# Patient Record
Sex: MALE | Race: WHITE | NOT HISPANIC OR LATINO | Employment: OTHER | URBAN - METROPOLITAN AREA
[De-identification: names, ages, dates, MRNs, and addresses within clinical notes are randomized per-mention and may not be internally consistent; named-entity substitution may affect disease eponyms.]

---

## 2017-04-18 ENCOUNTER — OFFICE VISIT (OUTPATIENT)
Dept: FAMILY MEDICINE | Facility: CLINIC | Age: 67
End: 2017-04-18
Payer: MEDICARE

## 2017-04-18 ENCOUNTER — DOCUMENTATION ONLY (OUTPATIENT)
Dept: FAMILY MEDICINE | Facility: CLINIC | Age: 67
End: 2017-04-18

## 2017-04-18 VITALS
TEMPERATURE: 98 F | WEIGHT: 203.94 LBS | HEART RATE: 66 BPM | SYSTOLIC BLOOD PRESSURE: 125 MMHG | HEIGHT: 70 IN | DIASTOLIC BLOOD PRESSURE: 84 MMHG | BODY MASS INDEX: 29.2 KG/M2

## 2017-04-18 DIAGNOSIS — E78.5 HYPERLIPIDEMIA, UNSPECIFIED HYPERLIPIDEMIA TYPE: ICD-10-CM

## 2017-04-18 DIAGNOSIS — T63.441A ALLERGIC REACTION TO BEE STING: ICD-10-CM

## 2017-04-18 DIAGNOSIS — J40 BRONCHITIS: ICD-10-CM

## 2017-04-18 DIAGNOSIS — J01.10 ACUTE NON-RECURRENT FRONTAL SINUSITIS: Primary | ICD-10-CM

## 2017-04-18 PROCEDURE — 99213 OFFICE O/P EST LOW 20 MIN: CPT | Mod: S$GLB,,, | Performed by: NURSE PRACTITIONER

## 2017-04-18 RX ORDER — FLUTICASONE PROPIONATE 50 MCG
1 SPRAY, SUSPENSION (ML) NASAL DAILY
COMMUNITY

## 2017-04-18 RX ORDER — AMOXICILLIN AND CLAVULANATE POTASSIUM 875; 125 MG/1; MG/1
1 TABLET, FILM COATED ORAL 2 TIMES DAILY
Qty: 10 TABLET | Refills: 0 | Status: SHIPPED | OUTPATIENT
Start: 2017-04-18 | End: 2017-04-24

## 2017-04-18 RX ORDER — BENZONATATE 200 MG/1
200 CAPSULE ORAL 3 TIMES DAILY PRN
Qty: 30 CAPSULE | Refills: 0 | Status: SHIPPED | OUTPATIENT
Start: 2017-04-18 | End: 2017-04-24

## 2017-04-18 RX ORDER — PREDNISONE 5 MG/1
TABLET ORAL
Qty: 21 TABLET | Refills: 0 | Status: SHIPPED | OUTPATIENT
Start: 2017-04-18 | End: 2017-09-01 | Stop reason: ALTCHOICE

## 2017-04-18 RX ORDER — ESOMEPRAZOLE MAGNESIUM 40 MG/1
40 CAPSULE, DELAYED RELEASE ORAL
COMMUNITY
End: 2018-10-09

## 2017-04-18 RX ORDER — ATENOLOL 25 MG/1
25 TABLET ORAL DAILY
COMMUNITY

## 2017-04-18 RX ORDER — LORATADINE 10 MG/1
10 TABLET ORAL DAILY PRN
COMMUNITY

## 2017-04-18 RX ORDER — ATORVASTATIN CALCIUM 40 MG/1
40 TABLET, FILM COATED ORAL DAILY
COMMUNITY
End: 2017-04-18 | Stop reason: SDUPTHER

## 2017-04-18 RX ORDER — ATORVASTATIN CALCIUM 40 MG/1
40 TABLET, FILM COATED ORAL NIGHTLY
Qty: 90 TABLET | Refills: 3 | Status: SHIPPED | OUTPATIENT
Start: 2017-04-18 | End: 2018-03-28 | Stop reason: SDUPTHER

## 2017-04-18 NOTE — PROGRESS NOTES
Health Maintenance Due   Topic Date Due    Hepatitis C Screening  1950    Lipid Panel  1950    TETANUS VACCINE  06/28/1970    Colonoscopy  06/28/2002    Zoster Vaccine  06/28/2012    Influenza Vaccine  08/01/2016

## 2017-04-18 NOTE — PROGRESS NOTES
Subjective:       Patient ID: Toby Baeza is a 66 y.o. male.    Chief Complaint: Cough    Cough   This is a new problem. The current episode started 1 to 4 weeks ago. The problem has been gradually improving. The cough is productive of sputum. Associated symptoms include wheezing. Pertinent negatives include no fever or shortness of breath. Nothing aggravates the symptoms. He has tried OTC cough suppressant (mucinex DM and loratidine) for the symptoms. The treatment provided moderate relief.     Review of Systems   Constitutional: Negative for fever.   Respiratory: Positive for cough and wheezing. Negative for shortness of breath.        Objective:      Physical Exam   Constitutional: He appears well-developed and well-nourished. No distress.   HENT:   Head: Normocephalic and atraumatic.   Right Ear: External ear normal.   Left Ear: External ear normal.   Mouth/Throat: Oropharynx is clear and moist. No oropharyngeal exudate.   Nares boggy and pale   Eyes: Conjunctivae are normal. Right eye exhibits no discharge. Left eye exhibits no discharge. No scleral icterus.   Neck: Normal range of motion. Neck supple.   Cardiovascular: Normal rate, regular rhythm and normal heart sounds.  Exam reveals no gallop and no friction rub.    No murmur heard.  Pulmonary/Chest: Effort normal and breath sounds normal. No respiratory distress. He has no wheezes. He has no rales.   Lymphadenopathy:     He has no cervical adenopathy.   Skin: Skin is warm and dry. He is not diaphoretic.   Psychiatric: He has a normal mood and affect. His behavior is normal.       Assessment:       1. Acute non-recurrent frontal sinusitis    2. Bronchitis    3. Allergic reaction to bee sting    4. Hyperlipidemia, unspecified hyperlipidemia type        Plan:       Acute non-recurrent frontal sinusitis  -     amoxicillin-clavulanate 875-125mg (AUGMENTIN) 875-125 mg per tablet; Take 1 tablet by mouth 2 (two) times daily. 1 tab po with food bid   Dispense: 10 tablet; Refill: 0    Bronchitis  -     benzonatate (TESSALON) 200 MG capsule; Take 1 capsule (200 mg total) by mouth 3 (three) times daily as needed for Cough.  Dispense: 30 capsule; Refill: 0  -     predniSONE (DELTASONE) 5 MG tablet; 6 tabs the first day, then 5 the next, then 4, 3, 2, 1  Dispense: 21 tablet; Refill: 0    Allergic reaction to bee sting  -     epinephrine 1 mg/mL Kit; Inject 1 each as directed daily as needed.  Dispense: 3 kit; Refill: 3    Hyperlipidemia, unspecified hyperlipidemia type  -     atorvastatin (LIPITOR) 40 MG tablet; Take 1 tablet (40 mg total) by mouth every evening.  Dispense: 90 tablet; Refill: 3         1 week if not better

## 2017-04-18 NOTE — MR AVS SNAPSHOT
[facility]  [address] [address] [address]  Phone: [phone]  Fax: [phone]                  Toby Baeza   2017 3:20 PM   Office Visit    Description:  Male : 1950   Provider:  JERARDO Quan   Department:  [facility]           Reason for Visit     Cough           Diagnoses this Visit        Comments    Acute non-recurrent frontal sinusitis    -  Primary     Bronchitis         Allergic reaction to bee sting         Hyperlipidemia, unspecified hyperlipidemia type                To Do List           Future Appointments        Provider Department Dept Phone    2017 11:00 AM Rafal Izaguirre MD [facility] [phone]      Goals (5 Years of Data)     None      Follow-Up and Disposition     Return if symptoms worsen or fail to improve in 1 week.    Follow-up and Disposition History       These Medications        Disp Refills Start End    amoxicillin-clavulanate 875-125mg (AUGMENTIN) 875-125 mg per tablet 10 tablet 0 2017    Take 1 tablet by mouth 2 (two) times daily. 1 tab po with food bid - Oral    Pharmacy: [facility] [address] [place] [address] [address] [facility] AT [facility] [address] Ph #: [phone]       benzonatate (TESSALON) 200 MG capsule 30 capsule 0 2017     Take 1 capsule (200 mg total) by mouth 3 (three) times daily as needed for Cough. - Oral    [facility] [id number]  [facility] [address] [facility] [address] Ph #: [phone]       predniSONE (DELTASONE) 5 MG tablet 21 tablet 0 2017     6 tabs the first day, then 5 the next, then 4, 3, 2, 1    Pharmacy: [facility] [id number]  [facility] [place] - [address] AT [facility] [address] Ph #: [phone]       epinephrine 1 mg/mL Kit 3 kit 3 2017     Inject 1 each as directed daily as needed. - Injection    [facility]: [facility] -   [address] Ph #: [phone]       atorvastatin (LIPITOR) 40 MG tablet 90 tablet 3 2017     Take 1 tablet (40 mg total) by mouth every evening. - Oral    [facility]: [facility] - [address] Ph #: [phone]         [facility]     [facility] On Call Nurse Care Line -  Assistance  Unless otherwise directed by your provider, please contact [facility], our nurse care line that is available for  assistance.     Registered nurses in the [facility] On Call Center provide: appointment scheduling, clinical advisement, health education, and other advisory services.  Call: [phone] (toll free)               Medications           Message regarding Medications     Verify the changes and/or additions to your medication regime listed below are the same as discussed with your clinician today.  If any of these changes or additions are incorrect, please notify your healthcare provider.        START taking these NEW medications        Refills    amoxicillin-clavulanate 875-125mg (AUGMENTIN) 875-125 mg per tablet 0    Sig: Take 1 tablet by mouth 2 (two) times daily. 1 tab po with food bid    Class: Normal    Route: Oral    benzonatate (TESSALON) 200 MG capsule 0    Sig: Take 1 capsule (200 mg total) by mouth 3 (three) times daily as needed for Cough.    Class: Normal    Route: Oral    predniSONE (DELTASONE) 5 MG tablet 0    Si tabs the first day, then 5 the next, then 4, 3, 2, 1    Class: Normal    epinephrine 1 mg/mL Kit 3    Sig: Inject 1 each as directed daily as needed.    Class: Normal    Route: Injection    atorvastatin (LIPITOR) 40 MG tablet 3    Sig: Take 1 tablet (40 mg total) by mouth every evening.    Class: Normal    Route: Oral           Verify that the below list of medications is an accurate representation of the medications you are currently taking.  If none reported, the list may be blank. If incorrect, please contact your  "healthcare provider. Carry this list with you in case of emergency.           Current Medications     ASPIRIN (ASPIR-81 ORAL) Take by mouth.    atenolol (TENORMIN) 25 MG tablet Take 25 mg by mouth once daily.    atorvastatin (LIPITOR) 40 MG tablet Take 1 tablet (40 mg total) by mouth every evening.    esomeprazole (NEXIUM) 40 MG capsule Take 40 mg by mouth before breakfast.    fluticasone (FLONASE) 50 mcg/actuation nasal spray 1 spray by Each Nare route once daily.    loratadine (CLARITIN) 10 mg tablet Take 10 mg by mouth daily as needed for Allergies.    SUMATRIPTAN SUCCINATE (IMITREX ORAL) Take 25 mg by mouth daily as needed.    amoxicillin-clavulanate 875-125mg (AUGMENTIN) 875-125 mg per tablet Take 1 tablet by mouth 2 (two) times daily. 1 tab po with food bid    benzonatate (TESSALON) 200 MG capsule Take 1 capsule (200 mg total) by mouth 3 (three) times daily as needed for Cough.    epinephrine 1 mg/mL Kit Inject 1 each as directed daily as needed.    predniSONE (DELTASONE) 5 MG tablet 6 tabs the first day, then 5 the next, then 4, 3, 2, 1           Clinical Reference Information           Your Vitals Were     BP Pulse Temp    125/84 (BP Location: Right arm, Patient Position: Sitting, BP Method: Automatic) 66 97.9 °F (36.6 °C) (Oral)    Height Weight BMI    5' 10" (1.778 m) 92.5 kg (203 lb 14.8 oz) 29.26 kg/m2      Blood Pressure          Most Recent Value    BP  125/84      Allergies as of 4/18/2017     Bee Venom Protein (Honey Bee)    Adhesive    Poison Deana Extract      Immunizations Administered on Date of Encounter - 4/18/2017     None      [facility] Sign-Up     Activating your [facility] account is as easy as 1-2-3!     1) Visit [email or web address], select Sign Up Now, enter this activation code and your date of birth, then select Next.  [email or web address]  Expires: 6/2/2017  2:59 PM      2) Create a username and password to use when you visit [facility] in the future and select a security question in case you lose " your password and select Next.    3) Enter your e-mail address and click Sign Up!    Additional Information  If you have questions, please e-mail [email or web address] or call [phone] to talk to our [facility] staff. Remember, [facility] is NOT to be used for urgent needs. For medical emergencies, dial 911.         Language Assistance Services     ATTENTION: Language assistance services are available, free of charge. Please call [phone].      ATENCIÓN: Si habla español, tiene a clemente disposición servicios gratuitos de asistencia lingüística. Llame al [phone].     CHÚ Ý: N?u b?n nói Ti?ng Vi?t, có các d?ch v? h? tr? ngôn ng? mi?n phí dành cho b?n. G?i s? [phone].         [facility] complies with applicable Federal civil rights laws and does not discriminate on the basis of race, color, national origin, age, disability, or sex.

## 2017-04-21 DIAGNOSIS — T63.441A ALLERGIC REACTION TO BEE STING: ICD-10-CM

## 2017-04-21 NOTE — TELEPHONE ENCOUNTER
rec'd fax from [employer] requesting clarification on rx epinephrine 1 mg/ml kit.    Fax states:  Please clarify the BRAND NAME DRUG NAME AND STRENGTH. Also include directions and quantity with your response. Resend.

## 2017-04-24 ENCOUNTER — DOCUMENTATION ONLY (OUTPATIENT)
Dept: FAMILY MEDICINE | Facility: CLINIC | Age: 67
End: 2017-04-24

## 2017-04-24 ENCOUNTER — OFFICE VISIT (OUTPATIENT)
Dept: FAMILY MEDICINE | Facility: CLINIC | Age: 67
End: 2017-04-24
Payer: MEDICARE

## 2017-04-24 VITALS
WEIGHT: 211.63 LBS | SYSTOLIC BLOOD PRESSURE: 148 MMHG | HEART RATE: 74 BPM | TEMPERATURE: 99 F | HEIGHT: 70 IN | DIASTOLIC BLOOD PRESSURE: 86 MMHG | OXYGEN SATURATION: 97 % | RESPIRATION RATE: 16 BRPM | BODY MASS INDEX: 30.3 KG/M2

## 2017-04-24 DIAGNOSIS — G43.909 MIGRAINE WITHOUT STATUS MIGRAINOSUS, NOT INTRACTABLE, UNSPECIFIED MIGRAINE TYPE: ICD-10-CM

## 2017-04-24 DIAGNOSIS — I10 ESSENTIAL HYPERTENSION: Primary | ICD-10-CM

## 2017-04-24 DIAGNOSIS — T78.40XA ALLERGIC REACTION, INITIAL ENCOUNTER: Primary | ICD-10-CM

## 2017-04-24 DIAGNOSIS — E78.5 HYPERLIPIDEMIA, UNSPECIFIED HYPERLIPIDEMIA TYPE: ICD-10-CM

## 2017-04-24 DIAGNOSIS — Z11.59 ENCOUNTER FOR HEPATITIS C SCREENING TEST FOR LOW RISK PATIENT: ICD-10-CM

## 2017-04-24 PROCEDURE — 99214 OFFICE O/P EST MOD 30 MIN: CPT | Mod: S$GLB,,, | Performed by: INTERNAL MEDICINE

## 2017-04-24 RX ORDER — SUMATRIPTAN SUCCINATE 100 MG/1
100 TABLET ORAL ONCE
Qty: 9 TABLET | Refills: 11 | Status: SHIPPED | OUTPATIENT
Start: 2017-04-24 | End: 2018-04-17 | Stop reason: SDUPTHER

## 2017-04-24 RX ORDER — EPINEPHRINE 0.3 MG/.3ML
1 INJECTION SUBCUTANEOUS ONCE
Qty: 4 EACH | Refills: 3 | Status: SHIPPED | OUTPATIENT
Start: 2017-04-24 | End: 2017-04-24

## 2017-04-24 NOTE — PATIENT INSTRUCTIONS
Lose 5 pounds.  Suggest [facility]    Low-Salt Diet  This diet removes foods that are high in salt. It also limits the amount of salt you use when cooking. It is most often used for people with high blood pressure, edema (fluid retention), and kidney, liver, or heart disease.  Table salt contains the mineral sodium. Your body needs sodium to work normally. But too much sodium can make your health problems worse. Your healthcare provider is recommending a low-salt (also called low-sodium) diet for you. Your total daily allowance of salt is 1,500 to 2,300 milligrams (mg). It is less than 1 teaspoon of table salt. This means you can have only about 500 to 700 mg of sodium at each meal. People with certain health problems should limit salt intake to the lower end of the recommended range.    When you cook, dont add much salt. If you can cook without using salt, even better. Dont add salt to your food at the table.  When shopping, read food labels. Salt is often called sodium on the label. Choose foods that are salt-free, low salt, or very low salt. Note that foods with reduced salt may not lower your salt intake enough.    Beans, potatoes, and pasta  Ok: Dry beans, split peas, lentils, potatoes, rice, macaroni, pasta, spaghetti without added salt  Avoid: Potato chips, tortilla chips, and similar products  Breads and cereals  Ok: Low-sodium breads, rolls, cereals, and cakes; low-salt crackers, matzo crackers  Avoid: Salted crackers, pretzels, popcorn, Burundian toast, pancakes, muffins  Dairy  Ok: Milk, chocolate milk, hot chocolate mix, low-salt cheeses, and yogurt  Avoid: Processed cheese and cheese spreads; Roquefort, Camembert, and cottage cheese; buttermilk, instant breakfast drink  Desserts  Ok: Ice cream, frozen yogurt, juice bars, gelatin, cookies and pies, sugar, honey, jelly, hard candy  Avoid: Most pies, cakes and cookies prepared or processed with salt; instant pudding  Drinks  Ok: Tea, coffee, fizzy  (carbonated) drinks, juices  Avoid: Flavored coffees, electrolyte replacement drinks, sports drinks  Meats  Ok: All fresh meat, fish, poultry, low-salt tuna, eggs, egg substitute  Avoid: Smoked, pickled, brine-cured, or salted meats and fish. This includes vazquez, chipped beef, corned beef, hot dogs, deli meats, ham, kosher meats, salt pork, sausage, canned tuna, salted codfish, smoked salmon, herring, sardines, or anchovies.  Seasonings and spices  Ok: Most seasonings are okay. Good substitutes for salt include: fresh herb blends, hot sauce, lemon, garlic, birch, vinegar, dry mustard, parsley, cilantro, horseradish, tomato paste, regular margarine, mayonnaise, unsalted butter, cream cheese, vegetable oil, cream, low-salt salad dressing and gravy.  Avoid: Regular ketchup, relishes, pickles, soy sauce, teriyaki sauce, Worcestershire sauce, BBQ sauce, tartar sauce, meat tenderizer, chili sauce, regular gravy, regular salad dressing, salted butter  Soups  Ok: Low-salt soups and broths made with allowed foods  Avoid: Bouillon cubes, soups with smoked or salted meats, regular soup and broth  Vegetables  Ok: Most vegetables are okay; also low-salt tomato and vegetable juices  Avoid: Sauerkraut and other brine-soaked vegetables; pickles and other pickled vegetables; tomato juice, olives  © 6951-6514 [employer]. [address] [place] [address]. All rights reserved. This information is not intended as a substitute for professional medical care. Always follow your healthcare professional's instructions.

## 2017-04-24 NOTE — MR AVS SNAPSHOT
[facility]  [address] [address] [address]  Phone: [phone]  Fax: [phone]                  Toby Baeza   2017 11:00 AM   Office Visit    Description:  Male : 1950   Provider:  Rafal Izaguirre MD   Department:  [facility]           Reason for Visit     Establish Care           Diagnoses this Visit        Comments    Essential hypertension    -  Primary     Hyperlipidemia, unspecified hyperlipidemia type         Migraine without status migrainosus, not intractable, unspecified migraine type         Encounter for hepatitis C screening test for low risk patient                To Do List           Future Appointments        Provider Department Dept Phone    2017 9:00 AM Rafal Izaguirre MD [facility] [phone]    10/23/2017 10:40 AM Rafal Izaguirre MD [facility] [phone]      Goals (5 Years of Data)     None      Follow-Up and Disposition     Return in about 6 months (around 10/24/2017).    Follow-up and Disposition History       These Medications        Disp Refills Start End    sumatriptan (IMITREX) 100 MG tablet 9 tablet 11 2017    Take 1 tablet (100 mg total) by mouth once. No more than twice in a 24 hour period - Oral    Ph[facility]acy: [facility] [address] #: [phone]         [facility]     [facility] On Call Nurse Care Line -  Assistance  Unless otherwise directed by your provider, please contact [facility], our nurse care line that is available for  assistance.     Registered nurses in the [facility] Center provide: appointment scheduling, clinical advisement, health education, and other advisory services.  Call: [phone] (toll free)               Medications           Message regarding Medications     Verify the changes and/or additions to your medication regime listed below are the  "same as discussed with your clinician today.  If any of these changes or additions are incorrect, please notify your healthcare provider.        START taking these NEW medications        Refills    sumatriptan (IMITREX) 100 MG tablet 11    Sig: Take 1 tablet (100 mg total) by mouth once. No more than twice in a 24 hour period    Class: Normal    Route: Oral      STOP taking these medications     amoxicillin-clavulanate 875-125mg (AUGMENTIN) 875-125 mg per tablet Take 1 tablet by mouth 2 (two) times daily. 1 tab po with food bid    benzonatate (TESSALON) 200 MG capsule Take 1 capsule (200 mg total) by mouth 3 (three) times daily as needed for Cough.           Verify that the below list of medications is an accurate representation of the medications you are currently taking.  If none reported, the list may be blank. If incorrect, please contact your healthcare provider. Carry this list with you in case of emergency.           Current Medications     ASPIRIN (ASPIR-81 ORAL) Take by mouth.    atenolol (TENORMIN) 25 MG tablet Take 25 mg by mouth once daily.    atorvastatin (LIPITOR) 40 MG tablet Take 1 tablet (40 mg total) by mouth every evening.    epinephrine 1 mg/mL Kit Inject 1 each as directed daily as needed.    esomeprazole (NEXIUM) 40 MG capsule Take 40 mg by mouth before breakfast.    fluticasone (FLONASE) 50 mcg/actuation nasal spray 1 spray by Each Nare route once daily.    loratadine (CLARITIN) 10 mg tablet Take 10 mg by mouth daily as needed for Allergies.    predniSONE (DELTASONE) 5 MG tablet 6 tabs the first day, then 5 the next, then 4, 3, 2, 1    sumatriptan (IMITREX) 100 MG tablet Take 1 tablet (100 mg total) by mouth once. No more than twice in a 24 hour period           Clinical Reference Information           Your Vitals Were     BP Pulse Temp Resp Height Weight    148/86 (BP Location: Right arm, Patient Position: Sitting, BP Method: Manual) 74 98.9 °F (37.2 °C) (Oral) 16 5' 10" (1.778 m) 96 kg (211 " lb 10.3 oz)    SpO2 BMI             97% 30.37 kg/m2         Blood Pressure          Most Recent Value    BP  (!)  148/86      Allergies as of 4/24/2017     Bee Venom Protein (Honey Bee)    Adhesive    Poison Deana Extract      Immunizations Administered on Date of Encounter - 4/24/2017     None      Orders Placed During Today's Visit     Future Labs/Procedures Expected by Expires    CBC auto differential  4/24/2017 4/25/2018    Comprehensive metabolic panel  4/24/2017 4/25/2018    Hepatitis C antibody  4/24/2017 6/23/2018    Lipid panel  4/24/2017 4/25/2018      [facility] Sign-Up     Activating your [facility] account is as easy as 1-2-3!     1) Visit [email or web address], select Sign Up Now, enter this activation code and your date of birth, then select Next.  [email or web address]  Expires: 6/2/2017  2:59 PM      2) Create a username and password to use when you visit [facility] in the future and select a security question in case you lose your password and select Next.    3) Enter your e-mail address and click Sign Up!    Additional Information  If you have questions, please e-mail [email or web address] or call [phone] to talk to our [facility] staff. Remember, [facility] is NOT to be used for urgent needs. For medical emergencies, dial 911.         Instructions    Lose 5 pounds.  Suggest [place] diet    Low-Salt Diet  This diet removes foods that are high in salt. It also limits the amount of salt you use when cooking. It is most often used for people with high blood pressure, edema (fluid retention), and kidney, liver, or heart disease.  Table salt contains the mineral sodium. Your body needs sodium to work normally. But too much sodium can make your health problems worse. Your healthcare provider is recommending a low-salt (also called low-sodium) diet for you. Your total daily allowance of salt is 1,500 to 2,300 milligrams (mg). It is less than 1 teaspoon of table salt. This means you can have only about 500 to 700  mg of sodium at each meal. People with certain health problems should limit salt intake to the lower end of the recommended range.    When you cook, dont add much salt. If you can cook without using salt, even better. Dont add salt to your food at the table.  When shopping, read food labels. Salt is often called sodium on the label. Choose foods that are salt-free, low salt, or very low salt. Note that foods with reduced salt may not lower your salt intake enough.    Beans, potatoes, and pasta  Ok: Dry beans, split peas, lentils, potatoes, rice, macaroni, pasta, spaghetti without added salt  Avoid: Potato chips, tortilla chips, and similar products  Breads and cereals  Ok: Low-sodium breads, rolls, cereals, and cakes; low-salt crackers, matzo crackers  Avoid: Salted crackers, pretzels, popcorn, Nepali toast, pancakes, muffins  Dairy  Ok: Milk, chocolate milk, hot chocolate mix, low-salt cheeses, and yogurt  Avoid: Processed cheese and cheese spreads; Roquefort, Camembert, and cottage cheese; buttermilk, instant breakfast drink  Desserts  Ok: Ice cream, frozen yogurt, juice bars, gelatin, cookies and pies, sugar, honey, jelly, hard candy  Avoid: Most pies, cakes and cookies prepared or processed with salt; instant pudding  Drinks  Ok: Tea, coffee, fizzy (carbonated) drinks, juices  Avoid: Flavored coffees, electrolyte replacement drinks, sports drinks  Meats  Ok: All fresh meat, fish, poultry, low-salt tuna, eggs, egg substitute  Avoid: Smoked, pickled, brine-cured, or salted meats and fish. This includes vazquez, chipped beef, corned beef, hot dogs, deli meats, ham, kosher meats, salt pork, sausage, canned tuna, salted codfish, smoked salmon, herring, sardines, or anchovies.  Seasonings and spices  Ok: Most seasonings are okay. Good substitutes for salt include: fresh herb blends, hot sauce, lemon, garlic, birch, vinegar, dry mustard, parsley, cilantro, horseradish, tomato paste, regular margarine, mayonnaise,  unsalted butter, cream cheese, vegetable oil, cream, low-salt salad dressing and gravy.  Avoid: Regular ketchup, relishes, pickles, soy sauce, teriyaki sauce, Worcestershire sauce, BBQ sauce, tartar sauce, meat tenderizer, chili sauce, regular gravy, regular salad dressing, salted butter  Soups  Ok: Low-salt soups and broths made with allowed foods  Avoid: Bouillon cubes, soups with smoked or salted meats, regular soup and broth  Vegetables  Ok: Most vegetables are okay; also low-salt tomato and vegetable juices  Avoid: Sauerkraut and other brine-soaked vegetables; pickles and other pickled vegetables; tomato juice, olives  © 2045-2637 [employer]. [address] [place] [address]. All rights reserved. This information is not intended as a substitute for professional medical care. Always follow your healthcare professional's instructions.       Language Assistance Services     ATTENTION: Language assistance services are available, free of charge. Please call [phone].      ATENCIÓN: Si habla michael, tiene a clemente disposición servicios gratuitos de asistencia lingüística. Llame al [phone].     CHÚ Ý: N?u b?n nói Ti?ng Vi?t, có các d?ch v? h? tr? ngôn ng? mi?n phí dành cho b?n. G?i s? [phone].         [facility] complies with applicable Federal civil rights laws and does not discriminate on the basis of race, color, national origin, age, disability, or sex.

## 2017-04-24 NOTE — PROGRESS NOTES
Subjective:       Patient ID: Toby Baeza is a 66 y.o. male.    Chief Complaint: Establish Care    HPI         CHIEF COMPLAINT: Hyperlipidemia. cholesterol screening: no.   HPI:     ONSET:    MODIFIERS/TREATMENTS: . Taking medications: yes. . Non-compliance with following diet: no. .     SYMPTOMS/RELATED:Possible medication side effects include:   Myalgia: no.  .     REVIEW OF SYMPTOMS: past weights:   Wt Readings from Last 1 Encounters:   04/24/17 1152 96 kg (211 lb 10.3 oz)                                                     Last lipids: total No results found for: CHOL                                                                  HDL No results found for: HDL                                                                  LDL No results found for: LDLCALC                                                                  TRIG No results found for: TRIG                                                                        CHIEF COMPLAINT: Hypertension  HPI:     ONSET:      QUALITY/COURSE:   Controlled:  yes     INTENSITY/SEVERITY:  Average blood pressure is  122/80.     MODIFIERS/TREATMENTS:  Taking medications: yes. .High sodium intake: no. alcohol: no      The following symptoms are positive only if BOLDED, otherwise are negative.      SYMPTOMS/RELATED: Possible medication side effects include:   Depression..  . Cough. . Constipation.    REVIEW OF SYMPTOMS: . Weight_loss . Weight_gain . Leg_cramps .Potency_problems .    TARGET ORGAN DAMAGE:: angina/ prior myocardial infarction, chronic kidney disease, heart failure, left ventricular hypertrophy, peripheral artery disease, prior coronary revascularization, retinopathy, stroke. transient ischemic attack.    Patient gets migraines with aura about twice a month.  Has photophobia and nausea and throbbing.  Usually bilateral.  Often wakes him up at night.  [facility] which doesn't seem to help much.  Review of Systems   Eyes: Negative  "for visual disturbance.   Respiratory: Negative for chest tightness and shortness of breath.    Cardiovascular: Negative for chest pain and leg swelling.   Neurological: Positive for headaches ( migraine). Negative for dizziness, syncope and weakness.   Psychiatric/Behavioral: Negative for dysphoric mood. The patient is not nervous/anxious.        Objective:      Vitals:    04/24/17 1152 04/24/17 1154   BP: (!) 151/93 (!) 148/86   Pulse: 74    Resp: 16    Temp: 98.9 °F (37.2 °C)    TempSrc: Oral    SpO2: 97%    Weight: 96 kg (211 lb 10.3 oz)    Height: 5' 10" (1.778 m)    PainSc: 0-No pain      Physical Exam   Constitutional: He appears well-developed and well-nourished.   Eyes: Pupils are equal, round, and reactive to light.   Cardiovascular: Normal rate, regular rhythm and normal heart sounds.    Pulmonary/Chest: Effort normal and breath sounds normal.   Abdominal: Soft. There is no tenderness.   Neurological: He is alert.   Psychiatric: He has a normal mood and affect. His behavior is normal. Thought content normal.   Nursing note and vitals reviewed.        Assessment:       1. Essential hypertension    2. Hyperlipidemia, unspecified hyperlipidemia type    3. Migraine without status migrainosus, not intractable, unspecified migraine type          Plan:     Essential hypertension    Hyperlipidemia, unspecified hyperlipidemia type    Migraine without status migrainosus, not intractable, unspecified migraine type    Other orders  -     sumatriptan (IMITREX) 100 MG tablet; Take 1 tablet (100 mg total) by mouth once. No more than twice in a 24 hour period  Dispense: 9 tablet; Refill: 11    No Follow-up on file.      "

## 2017-04-25 ENCOUNTER — TELEPHONE (OUTPATIENT)
Dept: FAMILY MEDICINE | Facility: CLINIC | Age: 67
End: 2017-04-25

## 2017-04-25 NOTE — TELEPHONE ENCOUNTER
----- Message from Mary Esparza sent at 4/24/2017  1:28 PM CDT -----  Contact: [facility],Kim Alvarez wants to speak with a nurse regarding varication on RX please call back at [phone] ref# [id number]

## 2017-08-02 ENCOUNTER — TELEPHONE (OUTPATIENT)
Dept: FAMILY MEDICINE | Facility: CLINIC | Age: 67
End: 2017-08-02

## 2017-08-02 NOTE — TELEPHONE ENCOUNTER
----- Message from Arlene Robbie sent at 8/2/2017 11:35 AM CDT -----  Contact: pt's wife walker/pt in the background [phone]  Pt's wife called just to touch base on the scan that they dropped off Monday? Pt stated they left scan with the man that is at the , and they just want Ms Sultana to look at it and provide advice.   They were told that Ms Sultana was out yesterday and today, but that she should be in clinic tomorrow. Pt was ok with that.    Please call [phone] when you get a moment  Thanks  rodney

## 2017-08-03 NOTE — TELEPHONE ENCOUNTER
Please notify patient that he has an abnormal MRI and he needs to follow up with the ordering physician to determine what is causing the abnormality.

## 2017-08-04 NOTE — TELEPHONE ENCOUNTER
Spoke with patient.  Instructed to keep appointments.  [facility].  Informed to call if he has questions or concerns.  Verbalized understanding.

## 2017-08-07 ENCOUNTER — PATIENT MESSAGE (OUTPATIENT)
Dept: FAMILY MEDICINE | Facility: CLINIC | Age: 67
End: 2017-08-07

## 2017-08-31 PROBLEM — R94.8 ABNORMAL BONE SCAN OF LUMBAR SPINE: Status: ACTIVE | Noted: 2017-08-31

## 2017-08-31 PROBLEM — R93.7 ABNORMAL MRI, LUMBAR SPINE: Status: ACTIVE | Noted: 2017-08-31

## 2017-08-31 PROBLEM — Z85.46 HISTORY OF PROSTATE CANCER: Status: ACTIVE | Noted: 2017-08-31

## 2017-09-01 ENCOUNTER — OFFICE VISIT (OUTPATIENT)
Dept: HEMATOLOGY/ONCOLOGY | Facility: CLINIC | Age: 67
End: 2017-09-01
Payer: MEDICARE

## 2017-09-01 VITALS
TEMPERATURE: 98 F | WEIGHT: 212.19 LBS | BODY MASS INDEX: 30.38 KG/M2 | HEIGHT: 70 IN | DIASTOLIC BLOOD PRESSURE: 98 MMHG | HEART RATE: 52 BPM | SYSTOLIC BLOOD PRESSURE: 156 MMHG

## 2017-09-01 DIAGNOSIS — R97.8 OTHER ABNORMAL TUMOR MARKERS: ICD-10-CM

## 2017-09-01 DIAGNOSIS — Z85.46 HISTORY OF PROSTATE CANCER: ICD-10-CM

## 2017-09-01 DIAGNOSIS — R94.8 ABNORMAL BONE SCAN OF LUMBAR SPINE: ICD-10-CM

## 2017-09-01 DIAGNOSIS — R93.7 ABNORMAL MRI, LUMBAR SPINE: ICD-10-CM

## 2017-09-01 PROCEDURE — 3080F DIAST BP >= 90 MM HG: CPT | Mod: ,,, | Performed by: INTERNAL MEDICINE

## 2017-09-01 PROCEDURE — 3077F SYST BP >= 140 MM HG: CPT | Mod: ,,, | Performed by: INTERNAL MEDICINE

## 2017-09-01 PROCEDURE — 99204 OFFICE O/P NEW MOD 45 MIN: CPT | Mod: ,,, | Performed by: INTERNAL MEDICINE

## 2017-09-01 NOTE — LETTER
September 1, 2017      Rafal Izaguirre MD  [address]  [place] [address]           [facility] Hematology Oncology  [address] [facility]  [address]  [place] [address]  Phone: [phone]  Fax: [phone]          Patient: Toby Baeza   MR Number: [id number]   YOB: 1950   Date of Visit: 9/1/2017       Dear Dr. Rafal Izaguirre:    Thank you for referring Toby Baeza to me for evaluation. Attached you will find relevant portions of my assessment and plan of care.    If you have questions, please do not hesitate to call me. I look forward to following Toby Baeza along with you.    Sincerely,    Salinas Cabrera MD    Enclosure  CC:  No Recipients    If you would like to receive this communication electronically, please contact [email or web address] or [phone] to request more information on [facility] access.    For providers and/or their staff who would like to refer a patient to [facility], please contact us through our one-stop-shop provider referral line, [facility], at [phone].    If you feel you have received this communication in error or would no longer like to receive these types of communications, please e-mail [email or web address]

## 2017-09-01 NOTE — PROGRESS NOTES
[facility] Hematolgy/Oncology  History & Physical    Subjective:      Patient ID:   NAME: Toby Baeza : 1950     67 y.o. male    Referring Doc: Dr Rafal Izaguirre  Other Physicians: Kawasaki, James Butler; [facility]; Mikki        Chief Complaint:   Abnormal bone scan    HPI:  67 y.o. male with diagnosis of abnormal bone scan and MRI of L-spine who has been referred by Dr Rafal Izaguirre for evaluation by medical hematology/oncology. Patient is here with his wife. He has been seen by Dr Sonu Salas with orthopedics. He is known to me previously several years ago because his first wife was a patient of mine. He has been having chronic back issues for over the past several years. He had injury while in [place] many years ago. [facility] and [facility] which showed lesion in L1 vertebrae.  He subsequently had a bone scan and was then referred to see Dr Salas. He is expected to see Dr Dung Young and/or Dr Fowler with neurosurgery in the near future.               ROS:   GEN: normal without any fever, night sweats or weight loss  HEENT: normal with no HA's, sore throat, stiff neck, changes in vision  CV: normal with no CP, SOB, PND, DOW or orthopnea  PULM: normal with no SOB, cough, hemoptysis, sputum or pleuritic pain  GI: normal with no abdominal pain, nausea, vomiting, constipation, diarrhea, melanotic stools, BRBPR, or hematemesis  : normal with no hematuria, dysuria  BREAST: normal with no mass, discharge, pain  SKIN: normal with no rash, erythema, bruising, or swelling       Past Medical/Surgical History:  Past Medical History:   Diagnosis Date    Abnormal bone scan of lumbar spine 2017    Abnormal MRI, lumbar spine 2017    Allergy     Back complaints     Cancer     prostate    Encounter for blood transfusion     History of prostate cancer 2017    Migraines     Prostate cancer      Past Surgical History:   Procedure Laterality Date    ANTERIOR  "TIBIAL TENDON TRANSFER  2003    PROCTECTOMY      PROSTATE SURGERY  1994    TONSILLECTOMY           Allergies:  Review of patient's allergies indicates:   Allergen Reactions    Bee venom protein (honey bee) Anaphylaxis    Adhesive     Poison ivy extract        Social/Family History:  Social History     Social History    Marital status:      Spouse name: N/A    Number of children: N/A    Years of education: N/A     Occupational History    Not on file.     Social History Main Topics    Smoking status: Former Smoker     Types: Cigarettes    Smokeless tobacco: Never Used    Alcohol use Yes      Comment: occasional    Drug use: No    Sexual activity: Not on file     Other Topics Concern    Not on file     Social History Narrative    No narrative on file     Family History   Problem Relation Age of Onset    Adopted: Yes         Medications:    Current Outpatient Prescriptions:     ASPIRIN (ASPIR-81 ORAL), Take by mouth., Disp: , Rfl:     atenolol (TENORMIN) 25 MG tablet, Take 25 mg by mouth once daily., Disp: , Rfl:     atorvastatin (LIPITOR) 40 MG tablet, Take 1 tablet (40 mg total) by mouth every evening., Disp: 90 tablet, Rfl: 3    epinephrine 1 mg/mL Kit, Inject 1 each as directed daily as needed., Disp: 3 kit, Rfl: 3    esomeprazole (NEXIUM) 40 MG capsule, Take 40 mg by mouth before breakfast., Disp: , Rfl:     fluticasone (FLONASE) 50 mcg/actuation nasal spray, 1 spray by Each Nare route once daily., Disp: , Rfl:     loratadine (CLARITIN) 10 mg tablet, Take 10 mg by mouth daily as needed for Allergies., Disp: , Rfl:     sumatriptan (IMITREX) 100 MG tablet, Take 1 tablet (100 mg total) by mouth once. No more than twice in a 24 hour period, Disp: 9 tablet, Rfl: 11      Pathology:    none    Objective:   Vitals:  Blood pressure (!) 156/98, pulse (!) 52, temperature 98.2 °F (36.8 °C), temperature source Oral, height 5' 10" (1.778 m), weight 96.3 kg (212 lb 3.2 oz).    Physical " Examination:   GEN: no apparent distress, comfortable; AAOx3  HEAD: atraumatic and normocephalic  EYES: no pallor, no icterus, PERRLA  ENT: OMM, no pharyngeal erythema, external ears WNL; no nasal discharge; no thrush  NECK: no masses, thyroid normal, trachea midline, no LAD/LN's, supple  CV: RRR with no murmur; normal pulse; normal S1 and S2; no pedal edema  CHEST: Normal respiratory effort; CTAB; normal breath sounds; no wheeze or crackles  ABDOM: nontender and nondistended; soft; normal bowel sounds; no rebound/guarding  MUSC/Skeletal: ROM normal; no crepitus; joints normal; no deformities or arthropathy  EXTREM: no clubbing, cyanosis, inflammation or swelling  SKIN: no rashes, lesions, ulcers, petechiae or subcutaneous nodules  : no kerr  NEURO: grossly intact; motor/sensory WNL; AAOx3; no tremors; uses cane  PSYCH: normal mood, affect and behavior  LYMPH: normal cervical, supraclavicular, axillary and groin LN's      Labs:     4/13/2017 in media section    Radiology/Diagnostic Studies:    Bone scan 8/22/17  MRI L-spine 7/27/17  L-spine Xrays 8/11/2017    All lab results and imaging results have been reviewed and discussed with the patient    Assessment:   (1) 67 y.o. male with diagnosis of abnormal MRI of L-spine from July 2017 and recent bone scan on 8/22 with abnormal areas of uptake in L1 vertebrae and sternum  - he has been having chronic back issues for over a year  - degenerative disc /arthritis issues  - prior lower back injury in 2001 while in [place] with the [employer]    (2) HTN and hypercholesterolemia    (3) Migraines    (4) Arthritis/OA    (5) Prostate Cancer in 1994 - [facility] and last PSA was done in 2016    (6) Colon polyps - followed by Dr Kaye with GI and had colonoscopy 2 years ago            Plan:       Abnormal bone scan of lumbar spine    Abnormal MRI, lumbar spine    History of prostate cancer            PLAN:  1. Schedule SPEP/24Hr UPEP, Ig panel and kappa/lambda  ratio  2. Check CEA, PSA  3. Schedule CT chest and abdom/pelvis  4. Patient to see Dr Young and Dr Fowler in near future  RTC in  2-3 weeks   Fax note to Rafal Izaguirre MD, Kawasaki, Butler, Dietz and Hector        I have explained and the patient understands all of  the current recommendation(s). I have answered all of their questions to the best of my ability and to their complete satisfaction.             Thank you for allowing me to participate in this patient's care. Please call with any questions or concerns.    Electronically signed Salinas Cabrera MD

## 2017-09-07 LAB — ISTAT CREATININE: 0.9 MG/DL (ref 0.6–1.4)

## 2017-09-08 LAB
CEA: 1.3 NG/ML
PSA, DIAGNOSTIC: <0 NG/ML (ref 0–3)

## 2017-09-09 LAB — CANCER AG19-9 SERPL-ACNC: 18 U/ML (ref 0–35)

## 2017-09-14 ENCOUNTER — OFFICE VISIT (OUTPATIENT)
Dept: HEMATOLOGY/ONCOLOGY | Facility: CLINIC | Age: 67
End: 2017-09-14
Payer: MEDICARE

## 2017-09-14 VITALS
BODY MASS INDEX: 29.66 KG/M2 | SYSTOLIC BLOOD PRESSURE: 144 MMHG | HEART RATE: 57 BPM | WEIGHT: 207.19 LBS | TEMPERATURE: 98 F | RESPIRATION RATE: 18 BRPM | DIASTOLIC BLOOD PRESSURE: 84 MMHG | HEIGHT: 70 IN

## 2017-09-14 DIAGNOSIS — Z85.46 HISTORY OF PROSTATE CANCER: ICD-10-CM

## 2017-09-14 DIAGNOSIS — R94.8 ABNORMAL BONE SCAN OF LUMBAR SPINE: ICD-10-CM

## 2017-09-14 DIAGNOSIS — R93.7 ABNORMAL MRI, LUMBAR SPINE: Primary | ICD-10-CM

## 2017-09-14 PROCEDURE — 3079F DIAST BP 80-89 MM HG: CPT | Mod: ,,, | Performed by: INTERNAL MEDICINE

## 2017-09-14 PROCEDURE — 99214 OFFICE O/P EST MOD 30 MIN: CPT | Mod: ,,, | Performed by: INTERNAL MEDICINE

## 2017-09-14 PROCEDURE — 3077F SYST BP >= 140 MM HG: CPT | Mod: ,,, | Performed by: INTERNAL MEDICINE

## 2017-09-14 NOTE — PROGRESS NOTES
"   [facility] Hematology/Oncology  PROGRESS NOTE      Subjective:       Patient ID:   NAME: Toby Baeza : 1950     67 y.o. male    Referring Doc: Rafal Izaguirre  Other Physicians: Kawasaki, James Butler; [facility]; Mikki    Chief Complaint:  abnorm MRI f/u    History of Present Illness:     Patient returns today for a 2nd regularly scheduled follow-up visit to go over the results of the recently ordered labs, tests and studies.  The patient met with Dr Dung Martinez with neurosurgery and he recommended biopsy. He had CT scans which is just showing the lesion on L1. He is here with his wife. He denies any CP, SOB, HA's or N/V.             ROS:   GEN: normal without any fever, night sweats or weight loss  HEENT: normal with no HA's, sore throat, stiff neck, changes in vision  CV: normal with no CP, SOB, PND, DOW or orthopnea  PULM: normal with no SOB, cough, hemoptysis, sputum or pleuritic pain  GI: normal with no abdominal pain, nausea, vomiting, constipation, diarrhea, melanotic stools, BRBPR, or hematemesis  : normal with no hematuria, dysuria  BREAST: normal with no mass, discharge, pain  SKIN: normal with no rash, erythema, bruising, or swelling    Allergies:  Review of patient's allergies indicates:   Allergen Reactions    Bee venom protein (honey bee) Anaphylaxis    Adhesive     Poison ivy extract        Medications:  See list      PMHx/PSHx Updates:  See patient's last visit with me on 2017.  See H&P on 2017        Pathology:  No matching staging information was found for the patient.          Objective:     Vitals:  Blood pressure (!) 144/84, pulse (!) 57, temperature 98 °F (36.7 °C), resp. rate 18, height 5' 10" (1.778 m), weight 94 kg (207 lb 3.2 oz).    Physical Examination:   GEN: no apparent distress, comfortable; AAOx3  HEAD: atraumatic and normocephalic  EYES: no pallor, no icterus, PERRLA  ENT: OMM, no pharyngeal erythema, external ears WNL; no nasal discharge; no thrush  NECK: " no masses, thyroid normal, trachea midline, no LAD/LN's, supple  CV: RRR with no murmur; normal pulse; normal S1 and S2; no pedal edema  CHEST: Normal respiratory effort; CTAB; normal breath sounds; no wheeze or crackles  ABDOM: nontender and nondistended; soft; normal bowel sounds; no rebound/guarding  MUSC/Skeletal: ROM normal; no crepitus; joints normal; no deformities or arthropathy  EXTREM: no clubbing, cyanosis, inflammation or swelling  SKIN: no rashes, lesions, ulcers, petechiae or subcutaneous nodules  : no kerr  NEURO: grossly intact; motor/sensory WNL; AAOx3; no tremors  PSYCH: normal mood, affect and behavior  LYMPH: normal cervical, supraclavicular, axillary and groin LN's            Labs:     9/8/2017      Radiology/Diagnostic Studies:    Ct Chest With Contrast    Result Date: 9/8/2017  CMS MANDATED QUALITY DATA - CT RADIATION ? 436 All CT scans at this facility utilize dose modulation, iterative reconstruction, and/or weight based dosing when appropriate to reduce radiation dose to as low as reasonably achievable. CT OF THE CHEST with  INTRAVENOUS CONTRAST CLINICAL INFORMATION: Restaging of prostate carcinoma. R 94.8 COMPARISON STUDIES: none FINDINGS : The exam was performed using 100 mL of intravenous Omnipaque 350. There are minimal arteriosclerotic changes in the aorta without aneurysm. No mediastinal or hilar lymphadenopathy or mass seen. Tracheobronchial tree, cardiac chambers and pericardium are normal. There are moderate calcifications and/or stents in the coronary arteries. There is a small sliding hiatal hernia. Lungs are clear. No pleural abnormality seen. There are no osteoblastic or osteoclastic skeletal changes in the bony thorax.     IMPRESSION: No active intrathoracic disease seen. No evidence of skeletal metastasis involving the bony thorax. Read and electronically signed by: Phi Wan MD on 9/8/2017 8:31 AM CDT PHI WAN MD    Ct Abdomen Pelvis With  Contrast    Result Date: 9/7/2017  CMS MANDATED QUALITY DATA - CT RADIATION ? 436 All CT scans at this facility utilize dose modulation, iterative reconstruction, and/or weight based dosing when appropriate to reduce radiation dose to as low as reasonably achievable. CT OF THE ABDOMEN AND PELVIS with  INTRAVENOUS CONTRAST CLINICAL INFORMATION: History of carcinoma of the prostate gland. Abnormal lesion in L1 vertebra seen on recent MRI of the lumbar spine dated 07/27/2017, suspicious for metastasis. R 94.8, R 93.7, Z 85.46, R 97.8 Comparison studies: MRI of the lumbar spine dated 08/22/2017, 20 2016 and 09/12/2012 FINDINGS : Exam was performed using 100 mL of intravenous Omnipaque 350. The gallbladder and the biliary tree, liver, portal vein, spleen, pancreas, adrenals, kidneys, ureters, urinary bladder and inferior vena cava are normal. There are mild diffuse arteriosclerotic changes in the aorta without aneurysm. Patient has had a radical prostatectomy. There are no enlarged pelvic, retroperitoneal or mesenteric lymph nodes. There is no ascites. There is no bowel thickening or dilatation. Appendix is normal. There is a small sliding hiatal hernia. No intra-abdominal masses, abnormal fluid collections, inflammatory changes or ventral hernia seen. There is a small subtle focal area of lucency in the left posterolateral aspect of the L1 vertebra probably extending to the left pedicle, corresponding to the abnormality seen and described on the MR scan. There is no disruption of the cortex or pathologic fracture and no associated mass extending into the epidural space or the paraspinal soft tissues. There are no osteoblastic or osteoclastic changes in the rest of the visualized bones.     IMPRESSION: 1. No intra-abdominal or pelvic abnormality seen. 2. Abnormal lesion in L1 vertebra corresponding to findings on last MRI of the lumbar spine. Since this lesion was not present on MR scans of the lumbar spine dated 912  20/12 and 20/1/2016 and in view of history of carcinoma of the prostate gland, findings are most likely due to solitary metastasis. This is amenable to fluoroscopic or CT guided percutaneous needle biopsy, if indicated. Read and electronically signed by: Phi Wan MD on 9/7/2017 12:12 PM CDT PHI WAN MD    [facility] Unknown Rad Eap    Result Date: 9/7/2017previously Reported As:CMS MANDATED QUALITY DATA - CT RADIATION ? 436All CT scans at this facility utilize dose modulation, iterative reconstruction, and/or weight based dosing when appropriate to reduce radiation dose to as low as reasonably achievable.CT OF THE ABDOMEN AND PELVIS with INTRAVENOUS CONTRASTCLINICAL INFORMATION: History of carcinoma of the prostate gland.Abnormal lesion in L1 vertebra seen on recent MRI of the lumbar spine dated 07/27/2017, suspicious for metastasis.R 94.8, R 93.7, Z 85.46, R 97.8Comparison studies: MRI of the lumbar spine dated 08/22/2017, 20 2016 and 09/12/2012FINDINGS : Exam was performed using 100 mL of intravenous Omnipaque 350.The gallbladder and the biliary tree, liver, portal vein, spleen, pancreas, adrenals, kidneys, ureters, urinary bladder and inferior vena cava are normal.There are mild diffuse arteriosclerotic changes in the aorta without aneurysm.Patient has had a radical prostatectomy.There are no enlarged pelvic, retroperitoneal or mesenteric lymph nodes.There is no ascites.There is no bowel thickening or dilatation.Appendix is normal.There is a small sliding hiatal hernia.No intra-abdominal masses, abnormal fluid collections, inflammatory changes or ventral hernia seen.There is a small subtle focal area of lucency in the left posterolateral aspect of the L1 vertebra probably extending to the left pedicle, corresponding to the abnormality seen and described on the MR scan.There is no disruption of the cortex or pathologic fracture and no associated mass extending into the epidural space or the paraspinal  soft tissues.There are no osteoblastic or osteoclastic changes in the rest of the visualized bones.    IMPRESSION:1. No intra-abdominal or pelvic abnormality seen.2. Abnormal lesion in L1 vertebra corresponding to findings on last MRI of the lumbar spine.Since this lesion was not present on MR scans of the lumbar spine dated 912 20/12 and 20/1/2016 and in view of history of carcinoma of the prostate gland, findings are most likely due to solitary metastasis.This is amenable to fluoroscopic or CT guided percutaneous needle biopsy, if indicated.Read and electronically signed by: Phi Jewell MD on 9/7/2017 12:12 PM CDT PHI JEWELL MD      I have reviewed all available lab results and radiology reports.    Assessment/Plan:     (1) 67 y.o. male with diagnosis of abnormal MRI of L-spine from July 2017 and recent bone scan on 8/22 with abnormal areas of uptake in L1 vertebrae and sternum  - he has been having chronic back issues for over a year  - degenerative disc /arthritis issues  - prior lower back injury in 2001 while in [place] with the [employer]  - CEA , AFPand  normal  - Ig panel normal  - beta 2 micro normal  - UPEP with IMF negative  - slight elevation in kappa/lambda ratio only at 24.33     (2) HTN and hypercholesterolemia     (3) Migraines     (4) Arthritis/OA     (5) Prostate Cancer in 1994 - [facility] and last PSA was done in 2016  - latest PSA was <0.0     (6) Colon polyps - followed by Dr Kaye with GI and had colonoscopy 2 years ago      PLAN:  1. Set Interventional radiology for biopsy of the L1 spot   2. Need latest notes from Dr Young  3. F/u with PCP, neurosurgery  4. RTC in 2-3 weeks  Fax note to Rafal Izaguirre MD, Kawasaki, Butler, Dietz and Hector    Discussion:     I have explained all of the above in detail and the patient understands all of the current recommendation(s). I have answered all of their questions to the best of my ability and to their complete satisfaction.    The patient is to continue with the current management plan.            Electronically signed by Salinas Cabrera MD

## 2017-09-14 NOTE — LETTER
September 14, 2017      Rafal Izaguirre MD  [address]  [facility] [address]           [facility] Hematology Oncology  [address] [facility]  [address]  [facility] [place] [address]  Phone: [phone]  Fax: [phone]          Patient: Toby Baeza   MR Number: [id number]   YOB: 1950   Date of Visit: 9/14/2017       Dear Dr. Rafal Izaguirre:    Thank you for referring Toby Baeza to me for evaluation. Attached you will find relevant portions of my assessment and plan of care.    If you have questions, please do not hesitate to call me. I look forward to following Toby Baeza along with you.    Sincerely,    Salinas Cabrera MD    Enclosure  CC:  No Recipients    If you would like to receive this communication electronically, please contact [email or web address] or [phone] to request more information on [employer] Link access.    For providers and/or their staff who would like to refer a patient to [facility], please contact us through our one-stop-shop provider referral line, [facility], at [phone].    If you feel you have received this communication in error or would no longer like to receive these types of communications, please e-mail [email or web address]

## 2017-09-18 NOTE — PROGRESS NOTES
I spoke to Dr Dustin Garza with Interventional Radiology and because of the anterior superior location of the abnormality in question on the lumbar vertebrae, it will be extremely difficult procedure and a negative result would be more likely nondiagnostic. Patient was seen by Dr Young with Neurosurgery and is to see Dr Fowler this week. We may opt to just repeat MRI in 3 months. Dr Garza said that the abnormality seen on MRI can not be discerned on the CT.

## 2017-10-09 ENCOUNTER — OFFICE VISIT (OUTPATIENT)
Dept: HEMATOLOGY/ONCOLOGY | Facility: CLINIC | Age: 67
End: 2017-10-09
Payer: MEDICARE

## 2017-10-09 VITALS
SYSTOLIC BLOOD PRESSURE: 130 MMHG | HEIGHT: 70 IN | HEART RATE: 71 BPM | BODY MASS INDEX: 26.04 KG/M2 | DIASTOLIC BLOOD PRESSURE: 69 MMHG | WEIGHT: 181.88 LBS | RESPIRATION RATE: 18 BRPM | TEMPERATURE: 98 F

## 2017-10-09 DIAGNOSIS — R94.8 ABNORMAL BONE SCAN OF LUMBAR SPINE: ICD-10-CM

## 2017-10-09 DIAGNOSIS — Z85.46 HISTORY OF PROSTATE CANCER: ICD-10-CM

## 2017-10-09 DIAGNOSIS — R93.7 ABNORMAL MRI, LUMBAR SPINE: Primary | ICD-10-CM

## 2017-10-09 PROCEDURE — 99214 OFFICE O/P EST MOD 30 MIN: CPT | Mod: ,,, | Performed by: INTERNAL MEDICINE

## 2017-10-09 RX ORDER — OMEPRAZOLE 40 MG/1
CAPSULE, DELAYED RELEASE ORAL
COMMUNITY
Start: 2017-08-05

## 2017-10-09 NOTE — LETTER
October 9, 2017      Rafal Izaguirre MD  [address]  [facility] [address]           [facility]  [address]  [facility] [address]  Phone: [phone]  Fax: [phone]          Patient: Toby Baeza   MR Number: [id number]   YOB: 1950   Date of Visit: 10/9/2017       Dear Dr. Rafal Izaguirre:    Thank you for referring Toby Baeza to me for evaluation. Attached you will find relevant portions of my assessment and plan of care.    If you have questions, please do not hesitate to call me. I look forward to following Toby Baeza along with you.    Sincerely,    Salinas Cabrera MD    Enclosure  CC:  No Recipients    If you would like to receive this communication electronically, please contact [email or web address] or [phone] to request more information on [facility] Link access.    For providers and/or their staff who would like to refer a patient to [facility], please contact us through our one-stop-shop provider referral line, [facility], at [phone].    If you feel you have received this communication in error or would no longer like to receive these types of communications, please e-mail [email or web address]

## 2017-10-09 NOTE — PROGRESS NOTES
[facility] Hematology/Oncology  PROGRESS NOTE      Subjective:       Patient ID:   NAME: Toby Baeza : 1950     67 y.o. male    Referring Doc: Rafal Izaguirre  Other Physicians: Kawasaki, James Butler, Albright, Tushar Osorio    Chief Complaint:  Abnormal vertebrae on scan    History of Present Illness:     Patient returns today for a regularly scheduled follow-up visit.  The patient has been seen by both Dr Martinez and Dr Finley. I discussed the case with Dr Dustin Garza with Interventional radiology previously and he was not confident that the lesion seen on MRI was able to be safely or easily biopsied. He has since seen Dr Finley with neurosurgery and he concurs that observation would be best option at this point. He had repeat MRI on 10/3 and the lesion in question has since resolved. He is here with his wife.             ROS:   GEN: normal without any fever, night sweats or weight loss  HEENT: normal with no HA's, sore throat, stiff neck, changes in vision  CV: normal with no CP, SOB, PND, DOW or orthopnea  PULM: normal with no SOB, cough, hemoptysis, sputum or pleuritic pain  GI: normal with no abdominal pain, nausea, vomiting, constipation, diarrhea, melanotic stools, BRBPR, or hematemesis  : normal with no hematuria, dysuria  BREAST: normal with no mass, discharge, pain  SKIN: normal with no rash, erythema, bruising, or swelling    Allergies:  Review of patient's allergies indicates:   Allergen Reactions    Bee venom protein (honey bee) Anaphylaxis    Adhesive     Poison ivy extract        Medications:    Current Outpatient Prescriptions:     ASPIRIN (ASPIR-81 ORAL), Take by mouth., Disp: , Rfl:     atenolol (TENORMIN) 25 MG tablet, Take 25 mg by mouth once daily., Disp: , Rfl:     atorvastatin (LIPITOR) 40 MG tablet, Take 1 tablet (40 mg total) by mouth every evening., Disp: 90 tablet, Rfl: 3    epinephrine 1 mg/mL Kit, Inject 1 each as directed daily as needed., Disp: 3 kit, Rfl:  "3    esomeprazole (NEXIUM) 40 MG capsule, Take 40 mg by mouth before breakfast., Disp: , Rfl:     fluticasone (FLONASE) 50 mcg/actuation nasal spray, 1 spray by Each Nare route once daily., Disp: , Rfl:     FLUZONE HIGH-DOSE 2017-18, PF, 180 mcg/0.5 mL vaccine, , Disp: , Rfl:     loratadine (CLARITIN) 10 mg tablet, Take 10 mg by mouth daily as needed for Allergies., Disp: , Rfl:     omeprazole (PRILOSEC) 40 MG capsule, , Disp: , Rfl:     sumatriptan (IMITREX) 100 MG tablet, Take 1 tablet (100 mg total) by mouth once. No more than twice in a 24 hour period, Disp: 9 tablet, Rfl: 11    PMHx/PSHx Updates:  See patient's last visit with me on 9/14/2017.  See H&P on 9/1/2017        Pathology:  No matching staging information was found for the patient.          Objective:     Vitals:  Blood pressure 130/69, pulse 71, temperature 98.2 °F (36.8 °C), resp. rate 18, height 5' 10" (1.778 m), weight 82.5 kg (181 lb 14.4 oz).    Physical Examination:   GEN: no apparent distress, comfortable; AAOx3  HEAD: atraumatic and normocephalic  EYES: no pallor, no icterus, PERRLA  ENT: OMM, no pharyngeal erythema, external ears WNL; no nasal discharge; no thrush  NECK: no masses, thyroid normal, trachea midline, no LAD/LN's, supple  CV: RRR with no murmur; normal pulse; normal S1 and S2; no pedal edema  CHEST: Normal respiratory effort; CTAB; normal breath sounds; no wheeze or crackles  ABDOM: nontender and nondistended; soft; normal bowel sounds; no rebound/guarding  MUSC/Skeletal: ROM normal; no crepitus; joints normal; no deformities or arthropathy  EXTREM: no clubbing, cyanosis, inflammation or swelling  SKIN: no rashes, lesions, ulcers, petechiae or subcutaneous nodules  : no kerr  NEURO: grossly intact; motor/sensory WNL; AAOx3; no tremors  PSYCH: normal mood, affect and behavior  LYMPH: normal cervical, supraclavicular, axillary and groin LN's            Labs:     9/25/2017      Radiology/Diagnostic Studies:    MRI 10/3 on " chart    I have reviewed all available lab results and radiology reports.    Assessment/Plan:   (1) 67 y.o. male with diagnosis of abnormal MRI of L-spine from July 2017 and recent bone scan on 8/22 with abnormal areas of uptake in L1 vertebrae and sternum  - he has been having chronic back issues for over a year  - degenerative disc /arthritis issues  - prior lower back injury in 2001 while in [place] with the [employer]  - CEA , AFPand  normal  - Ig panel normal  - beta 2 micro normal  - UPEP with IMF negative  - slight elevation in kappa/lambda ratio only at 24.33  - he has been seen by both Dr Martinez and Dr Finley. I discussed the case with Dr Dustin Garza with Interventional radiology previously and he was not confident that the lesion seen on MRI was able to be safely or easily biopsied. He has since seen Dr Finley with orthopedics and he concurs that observation would be best option at this point. He had repeat MRI on 10/3 and the lesion in question has since resolved.       (2) HTN and hypercholesterolemia     (3) Migraines     (4) Arthritis/OA     (5) Prostate Cancer in 1994 - followed by [facility] and last PSA was done in 2016  - latest PSA was <0.0     (6) Colon polyps - followed by Dr Kaye with GI and had colonoscopy 2 years ago           PLAN:  1. Observation only at this time  2. Repeat protein studies in 6 months as a precaution  3. F/U with PCP, ortho etc  4. RTC in 6 months  Fax note to Rafal Izaguirre MD, Kawasaki, Butler, Dietze, Maritza and Hector    Discussion:     I have explained all of the above in detail and the patient understands all of the current recommendation(s). I have answered all of their questions to the best of my ability and to their complete satisfaction.   The patient is to continue with the current management plan.            Electronically signed by Salinas Cabrera MD

## 2018-03-28 DIAGNOSIS — E78.5 HYPERLIPIDEMIA, UNSPECIFIED HYPERLIPIDEMIA TYPE: ICD-10-CM

## 2018-03-28 RX ORDER — ATORVASTATIN CALCIUM 40 MG/1
TABLET, FILM COATED ORAL
Qty: 90 TABLET | Refills: 0 | Status: SHIPPED | OUTPATIENT
Start: 2018-03-28

## 2018-04-17 RX ORDER — SUMATRIPTAN SUCCINATE 100 MG/1
100 TABLET ORAL ONCE
Qty: 9 TABLET | Refills: 0 | Status: SHIPPED | OUTPATIENT
Start: 2018-04-17 | End: 2021-07-16

## 2018-06-26 DIAGNOSIS — E78.5 HYPERLIPIDEMIA, UNSPECIFIED HYPERLIPIDEMIA TYPE: ICD-10-CM

## 2018-06-26 RX ORDER — ATORVASTATIN CALCIUM 40 MG/1
TABLET, FILM COATED ORAL
Qty: 90 TABLET | Refills: 0 | OUTPATIENT
Start: 2018-06-26

## 2018-06-26 NOTE — TELEPHONE ENCOUNTER
Patient has not been seen in over a year. He needs to be seen before we can refill her medications.

## 2018-09-30 ENCOUNTER — EXTERNAL CHRONIC CARE MANAGEMENT (OUTPATIENT)
Dept: PRIMARY CARE CLINIC | Facility: CLINIC | Age: 68
End: 2018-09-30

## 2018-10-09 ENCOUNTER — OFFICE VISIT (OUTPATIENT)
Dept: ORTHOPEDICS | Facility: CLINIC | Age: 68
End: 2018-10-09
Payer: MEDICARE

## 2018-10-09 VITALS
HEART RATE: 75 BPM | SYSTOLIC BLOOD PRESSURE: 146 MMHG | WEIGHT: 181 LBS | DIASTOLIC BLOOD PRESSURE: 93 MMHG | BODY MASS INDEX: 25.91 KG/M2 | HEIGHT: 70 IN

## 2018-10-09 DIAGNOSIS — S60.10XA CONTUSION OF NAIL BED OF FINGER, INITIAL ENCOUNTER: Primary | ICD-10-CM

## 2018-10-09 PROCEDURE — 73140 X-RAY EXAM OF FINGER(S): CPT | Mod: RT,,, | Performed by: ORTHOPAEDIC SURGERY

## 2018-10-09 PROCEDURE — 99213 OFFICE O/P EST LOW 20 MIN: CPT | Mod: 25,,, | Performed by: ORTHOPAEDIC SURGERY

## 2018-10-09 NOTE — PROGRESS NOTES
[facility] ORTHOPEDICS    Subjective:     Chief Complaint:   Chief Complaint   Patient presents with    Right Hand - Pain     Right hand ring finger pain x Friday night. States that he was taking out the garbage and feel. States that he hit his ring finger and it is now bruised and swollen at the tip.        Past Medical History:   Diagnosis Date    Abnormal bone scan of lumbar spine 8/31/2017    Abnormal MRI, lumbar spine 8/31/2017    Allergy     Back complaints     Cancer     prostate    Encounter for blood transfusion     History of prostate cancer 8/31/2017    Migraines     Prostate cancer        Past Surgical History:   Procedure Laterality Date    ANTERIOR TIBIAL TENDON TRANSFER  2003    PROCTECTOMY      PROSTATE SURGERY  1994    TONSILLECTOMY         Current Outpatient Medications   Medication Sig    atenolol (TENORMIN) 25 MG tablet Take 25 mg by mouth once daily.    atorvastatin (LIPITOR) 40 MG tablet TAKE 1 TABLET EVERY EVENING    omeprazole (PRILOSEC) 40 MG capsule     ASPIRIN (ASPIR-81 ORAL) Take by mouth.    epinephrine 1 mg/mL Kit Inject 1 each as directed daily as needed.    fluticasone (FLONASE) 50 mcg/actuation nasal spray 1 spray by Each Nare route once daily.    FLUZONE HIGH-DOSE 2017-18, PF, 180 mcg/0.5 mL vaccine     loratadine (CLARITIN) 10 mg tablet Take 10 mg by mouth daily as needed for Allergies.    sumatriptan (IMITREX) 100 MG tablet Take 1 tablet (100 mg total) by mouth once. No more than twice in a 24 hour period     No current facility-administered medications for this visit.        Review of patient's allergies indicates:   Allergen Reactions    Bee venom protein (honey bee) Anaphylaxis    Adhesive     Poison ivy extract        Family History   Adopted: Yes       Social History     Socioeconomic History    Marital status:      Spouse name: Not on file    Number of children: Not on file    Years of education: Not on file    Highest education level:  Not on file   Social Needs    Financial resource strain: Not on file    Food insecurity - worry: Not on file    Food insecurity - inability: Not on file    Transportation needs - medical: Not on file    Transportation needs - non-medical: Not on file   Occupational History    Not on file   Tobacco Use    Smoking status: Former Smoker     Types: Cigarettes    Smokeless tobacco: Never Used   Substance and Sexual Activity    Alcohol use: Yes     Comment: occasional    Drug use: No    Sexual activity: Not on file   Other Topics Concern    Not on file   Social History Narrative    Not on file       History of present illness: Patient comes in today for the right hand ring finger. He had a fall. He landed onto the nail. It became very painful. Now the pain is significantly improved.      Review of Systems:    Constitution: Negative for chills, fever, and sweats.  Negative for unexplained weight loss.    HENT:  Negative for headaches and blurry vision.    Cardiovascular:Negative for chest pain or irregular heart beat. Negative for hypertension.    Respiratory:  Negative for cough and shortness of breath.    Gastrointestinal: Negative for abdominal pain, heartburn, melena, nausea, and vomitting.    Genitourinary:  Negative bladder incontinence and dysuria.    Musculoskeletal:  See HPI for details.     Neurological: Negative for numbness.    Psychiatric/Behavioral: Negative for depression.  The patient is not nervous/anxious.      Endocrine: Negative for polyuria    Hematologic/Lymphatic: Negative for bleeding problem.  Does not bruise/bleed easily.    Skin: Negative for poor would healing and rash    Objective:      Physical Examination:    Vital Signs:    Vitals:    10/09/18 0956   BP: (!) 146/93   Pulse: 75       Body mass index is 25.97 kg/m².    This a well-developed, well nourished patient in no acute distress.  They are alert and oriented and cooperative to examination.        Patient has ecchymosis  around the right nail bed. He can fully extend and flex the DIP joint. He can close the fingers to the palm. Full range of motion of the contralateral hand.  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 There is in all fracture which is clearly chronic around the middle phalanx. No new fractures.    Assessment/Plan:      Nailbed hematoma. This will resolve on its own. Should the DIP joint remaining chronically painful he would need a DIP fusion.      This note was created using Dragon voice recognition software that occasionally misinterpreted phrases or words.

## 2018-10-15 ENCOUNTER — TELEPHONE (OUTPATIENT)
Dept: HEMATOLOGY/ONCOLOGY | Facility: CLINIC | Age: 68
End: 2018-10-15

## 2018-10-15 DIAGNOSIS — R94.8 ABNORMAL BONE SCAN OF LUMBAR SPINE: ICD-10-CM

## 2018-10-15 DIAGNOSIS — Z85.46 HISTORY OF PROSTATE CANCER: Primary | ICD-10-CM

## 2018-10-15 NOTE — TELEPHONE ENCOUNTER
Called patient told him put labs in he said he would get them done next week       ----- Message from Aisha La sent at 10/15/2018 11:33 AM CDT -----  Pt needs to reschedule appt but needs lab orders renewed. Goes to [facility] for labs.    CB# [phone]    THanks,    Aisha

## 2018-11-18 ENCOUNTER — PATIENT MESSAGE (OUTPATIENT)
Dept: HEMATOLOGY/ONCOLOGY | Facility: CLINIC | Age: 68
End: 2018-11-18

## 2018-11-19 ENCOUNTER — TELEPHONE (OUTPATIENT)
Dept: HEMATOLOGY/ONCOLOGY | Facility: CLINIC | Age: 68
End: 2018-11-19

## 2018-11-19 ENCOUNTER — PATIENT MESSAGE (OUTPATIENT)
Dept: HEMATOLOGY/ONCOLOGY | Facility: CLINIC | Age: 68
End: 2018-11-19

## 2018-11-19 DIAGNOSIS — Z85.46 HISTORY OF PROSTATE CANCER: Primary | ICD-10-CM

## 2018-11-20 LAB
ALBUMIN SERPL-MCNC: 4.3 G/DL (ref 3.1–4.7)
ALP SERPL-CCNC: 40 IU/L (ref 40–104)
ALT (SGPT): 131 IU/L (ref 3–33)
AST SERPL-CCNC: 81 IU/L (ref 10–40)
BASOPHILS NFR BLD: 0.1 K/UL (ref 0–0.2)
BASOPHILS NFR BLD: 1.8 %
BILIRUB SERPL-MCNC: 0.5 MG/DL (ref 0.3–1)
BUN SERPL-MCNC: 10 MG/DL (ref 8–20)
CALCIUM SERPL-MCNC: 9.9 MG/DL (ref 7.7–10.4)
CHLORIDE: 102 MMOL/L (ref 98–110)
CO2 SERPL-SCNC: 28.6 MMOL/L (ref 22.8–31.6)
CREATININE: 1.06 MG/DL (ref 0.6–1.4)
EOSINOPHIL NFR BLD: 0.2 K/UL (ref 0–0.7)
EOSINOPHIL NFR BLD: 3.2 %
ERYTHROCYTE [DISTWIDTH] IN BLOOD BY AUTOMATED COUNT: 13.7 % (ref 11.7–14.9)
GLUCOSE: 109 MG/DL (ref 70–99)
GRAN #: 3.1 K/UL (ref 1.4–6.5)
GRAN%: 55.6 %
HCT VFR BLD AUTO: 46.8 % (ref 39–55)
HGB BLD-MCNC: 15.4 G/DL (ref 14–16)
IMMATURE GRANS (ABS): 0 K/UL (ref 0–1)
IMMATURE GRANULOCYTES: 0.2 %
LYMPH #: 1.6 K/UL (ref 1.2–3.4)
LYMPH%: 28.2 %
MCH RBC QN AUTO: 34.1 PG (ref 25–35)
MCHC RBC AUTO-ENTMCNC: 32.9 G/DL (ref 31–36)
MCV RBC AUTO: 103.8 FL (ref 80–100)
MONO #: 0.6 K/UL (ref 0.1–0.6)
MONO%: 11 %
NUCLEATED RBCS: 0 %
PLATELET # BLD AUTO: 274 K/UL (ref 140–440)
PMV BLD AUTO: 11.3 FL (ref 8.8–12.7)
POTASSIUM SERPL-SCNC: 4.5 MMOL/L (ref 3.5–5)
PROT SERPL-MCNC: 7.3 G/DL (ref 6–8.2)
RBC # BLD AUTO: 4.51 M/UL (ref 4.3–5.9)
SODIUM: 139 MMOL/L (ref 134–144)
WBC # BLD AUTO: 5.6 K/UL (ref 5–10)

## 2018-11-21 LAB
KAPPA FREE LIGHT CHAINS: 17.2 MG/L (ref 3.3–19.4)
KAPPA/LAMBDA RATIO, S: 0.99 (ref 0.26–1.65)
LAMBDA FREE LIGHT CHAINS: 17.3 MG/L (ref 5.7–26.3)

## 2018-11-23 LAB
ALBUMIN SERPL-MCNC: 3.9 G/DL (ref 2.9–4.4)
ALBUMIN/GLOB SERPL ELPH: 1.2 {RATIO} (ref 0.7–1.7)
ALPHA 1 GLOBULIN/PROTEIN TOTAL: 0.2 G/DL (ref 0–0.4)
ALPHA 2 GLOBULIN/PROTEIN TOTAL: 0.8 G/DL (ref 0.4–1)
B-GLOBULIN FLD ELPH-MCNC: 1.1 G/DL (ref 0.7–1.3)
GAMMA GLOB FLD ELPH-MCNC: 1 G/DL (ref 0.4–1.8)
GLOBULIN SER CALC-MCNC: 3.2 G/DL (ref 2.2–3.9)
Lab: NORMAL
M PROTEIN MFR UR ELPH: NORMAL G/DL
PROT SERPL-MCNC: 7.1 G/DL (ref 6–8.5)

## 2018-11-26 ENCOUNTER — PATIENT MESSAGE (OUTPATIENT)
Dept: HEMATOLOGY/ONCOLOGY | Facility: CLINIC | Age: 68
End: 2018-11-26

## 2018-11-26 ENCOUNTER — TELEPHONE (OUTPATIENT)
Dept: HEMATOLOGY/ONCOLOGY | Facility: CLINIC | Age: 68
End: 2018-11-26

## 2018-12-27 ENCOUNTER — TELEPHONE (OUTPATIENT)
Dept: INTERNAL MEDICINE | Facility: CLINIC | Age: 68
End: 2018-12-27

## 2018-12-27 ENCOUNTER — TELEPHONE (OUTPATIENT)
Dept: ADMINISTRATIVE | Facility: HOSPITAL | Age: 68
End: 2018-12-27

## 2018-12-27 NOTE — TELEPHONE ENCOUNTER
----- Message from Rafal Izaguirre MD sent at 12/27/2018  1:00 PM CST -----  Patient needs to see his oncologist

## 2018-12-27 NOTE — TELEPHONE ENCOUNTER
Pt wanted Dr. Izaguirre to know he saw Dr. Villagran and asked that we have his most recent visit record sent to us for his chart. I will take care of that.

## 2019-11-15 ENCOUNTER — PES CALL (OUTPATIENT)
Dept: ADMINISTRATIVE | Facility: CLINIC | Age: 69
End: 2019-11-15

## 2020-06-23 ENCOUNTER — LAB VISIT (OUTPATIENT)
Dept: PRIMARY CARE CLINIC | Facility: OTHER | Age: 70
End: 2020-06-23
Payer: MEDICARE

## 2020-06-23 DIAGNOSIS — Z11.59 SPECIAL SCREENING EXAMINATION FOR UNSPECIFIED VIRAL DISEASE: ICD-10-CM

## 2020-06-23 PROCEDURE — U0003 INFECTIOUS AGENT DETECTION BY NUCLEIC ACID (DNA OR RNA); SEVERE ACUTE RESPIRATORY SYNDROME CORONAVIRUS 2 (SARS-COV-2) (CORONAVIRUS DISEASE [COVID-19]), AMPLIFIED PROBE TECHNIQUE, MAKING USE OF HIGH THROUGHPUT TECHNOLOGIES AS DESCRIBED BY CMS-2020-01-R: HCPCS

## 2020-06-26 ENCOUNTER — TELEPHONE (OUTPATIENT)
Dept: FAMILY MEDICINE | Facility: CLINIC | Age: 70
End: 2020-06-26

## 2020-06-26 DIAGNOSIS — Z86.19 HISTORY OF VIRAL ILLNESS: Primary | ICD-10-CM

## 2020-06-26 LAB — SARS-COV-2 RNA RESP QL NAA+PROBE: NOT DETECTED

## 2021-01-19 ENCOUNTER — OFFICE VISIT (OUTPATIENT)
Dept: ORTHOPEDICS | Facility: CLINIC | Age: 71
End: 2021-01-19
Payer: MEDICARE

## 2021-01-19 VITALS
HEIGHT: 70 IN | BODY MASS INDEX: 26.48 KG/M2 | DIASTOLIC BLOOD PRESSURE: 78 MMHG | HEART RATE: 64 BPM | SYSTOLIC BLOOD PRESSURE: 130 MMHG | WEIGHT: 185 LBS

## 2021-01-19 DIAGNOSIS — M12.811 ROTATOR CUFF ARTHROPATHY OF RIGHT SHOULDER: ICD-10-CM

## 2021-01-19 DIAGNOSIS — M75.41 IMPINGEMENT SYNDROME OF RIGHT SHOULDER: Primary | ICD-10-CM

## 2021-01-19 PROCEDURE — 99213 PR OFFICE/OUTPT VISIT, EST, LEVL III, 20-29 MIN: ICD-10-PCS | Mod: 25,S$GLB,, | Performed by: ORTHOPAEDIC SURGERY

## 2021-01-19 PROCEDURE — 99213 OFFICE O/P EST LOW 20 MIN: CPT | Mod: 25,S$GLB,, | Performed by: ORTHOPAEDIC SURGERY

## 2021-01-19 PROCEDURE — 20610 LARGE JOINT ASPIRATION/INJECTION: R SUBACROMIAL BURSA: ICD-10-PCS | Mod: RT,S$GLB,, | Performed by: ORTHOPAEDIC SURGERY

## 2021-01-19 PROCEDURE — 20610 DRAIN/INJ JOINT/BURSA W/O US: CPT | Mod: RT,S$GLB,, | Performed by: ORTHOPAEDIC SURGERY

## 2021-01-19 RX ORDER — METHYLPREDNISOLONE ACETATE 40 MG/ML
40 INJECTION, SUSPENSION INTRA-ARTICULAR; INTRALESIONAL; INTRAMUSCULAR; SOFT TISSUE
Status: DISCONTINUED | OUTPATIENT
Start: 2021-01-19 | End: 2021-01-19 | Stop reason: HOSPADM

## 2021-01-19 RX ORDER — DICLOFENAC SODIUM 10 MG/G
GEL TOPICAL
COMMUNITY
Start: 2021-01-06

## 2021-01-19 RX ORDER — SILDENAFIL 100 MG/1
TABLET, FILM COATED ORAL
COMMUNITY
Start: 2021-01-06

## 2021-01-19 RX ADMIN — METHYLPREDNISOLONE ACETATE 40 MG: 40 INJECTION, SUSPENSION INTRA-ARTICULAR; INTRALESIONAL; INTRAMUSCULAR; SOFT TISSUE at 12:01

## 2021-03-02 ENCOUNTER — OFFICE VISIT (OUTPATIENT)
Dept: ORTHOPEDICS | Facility: CLINIC | Age: 71
End: 2021-03-02
Payer: MEDICARE

## 2021-03-02 VITALS
HEART RATE: 62 BPM | HEIGHT: 70 IN | SYSTOLIC BLOOD PRESSURE: 129 MMHG | BODY MASS INDEX: 27.77 KG/M2 | WEIGHT: 194 LBS | DIASTOLIC BLOOD PRESSURE: 80 MMHG

## 2021-03-02 DIAGNOSIS — M75.41 IMPINGEMENT SYNDROME OF RIGHT SHOULDER: Primary | ICD-10-CM

## 2021-03-02 DIAGNOSIS — M75.42 IMPINGEMENT SYNDROME OF LEFT SHOULDER: ICD-10-CM

## 2021-03-02 PROCEDURE — 99213 OFFICE O/P EST LOW 20 MIN: CPT | Mod: S$GLB,,, | Performed by: ORTHOPAEDIC SURGERY

## 2021-03-02 PROCEDURE — 99213 PR OFFICE/OUTPT VISIT, EST, LEVL III, 20-29 MIN: ICD-10-PCS | Mod: S$GLB,,, | Performed by: ORTHOPAEDIC SURGERY

## 2021-04-29 ENCOUNTER — PATIENT MESSAGE (OUTPATIENT)
Dept: RESEARCH | Facility: HOSPITAL | Age: 71
End: 2021-04-29

## 2021-06-21 ENCOUNTER — OFFICE VISIT (OUTPATIENT)
Dept: ORTHOPEDICS | Facility: CLINIC | Age: 71
End: 2021-06-21
Payer: MEDICARE

## 2021-06-21 VITALS — WEIGHT: 194 LBS | BODY MASS INDEX: 27.77 KG/M2 | HEIGHT: 70 IN

## 2021-06-21 DIAGNOSIS — S20.212A CONTUSION OF RIB ON LEFT SIDE, INITIAL ENCOUNTER: ICD-10-CM

## 2021-06-21 DIAGNOSIS — S60.212A CONTUSION OF LEFT WRIST, INITIAL ENCOUNTER: Primary | ICD-10-CM

## 2021-06-21 DIAGNOSIS — S63.502A SPRAIN OF LEFT WRIST, INITIAL ENCOUNTER: ICD-10-CM

## 2021-06-21 PROCEDURE — 99213 OFFICE O/P EST LOW 20 MIN: CPT | Mod: S$GLB,,, | Performed by: PHYSICIAN ASSISTANT

## 2021-06-21 PROCEDURE — 99213 PR OFFICE/OUTPT VISIT, EST, LEVL III, 20-29 MIN: ICD-10-PCS | Mod: S$GLB,,, | Performed by: PHYSICIAN ASSISTANT

## 2021-06-21 RX ORDER — ATORVASTATIN CALCIUM 80 MG/1
TABLET, FILM COATED ORAL
COMMUNITY
Start: 2021-04-06 | End: 2022-01-13

## 2021-07-16 ENCOUNTER — OFFICE VISIT (OUTPATIENT)
Dept: ORTHOPEDICS | Facility: CLINIC | Age: 71
End: 2021-07-16
Payer: MEDICARE

## 2021-07-16 VITALS — BODY MASS INDEX: 27.77 KG/M2 | WEIGHT: 194 LBS | HEIGHT: 70 IN

## 2021-07-16 DIAGNOSIS — S76.311A STRAIN OF RIGHT HAMSTRING MUSCLE, INITIAL ENCOUNTER: ICD-10-CM

## 2021-07-16 DIAGNOSIS — M16.11 ARTHRITIS OF RIGHT HIP: Primary | ICD-10-CM

## 2021-07-16 DIAGNOSIS — S69.82XA TFCC (TRIANGULAR FIBROCARTILAGE COMPLEX) INJURY, LEFT, INITIAL ENCOUNTER: ICD-10-CM

## 2021-07-16 DIAGNOSIS — M17.11 PRIMARY OSTEOARTHRITIS OF RIGHT KNEE: ICD-10-CM

## 2021-07-16 PROCEDURE — 99213 PR OFFICE/OUTPT VISIT, EST, LEVL III, 20-29 MIN: ICD-10-PCS | Mod: S$GLB,,, | Performed by: PHYSICIAN ASSISTANT

## 2021-07-16 PROCEDURE — 99213 OFFICE O/P EST LOW 20 MIN: CPT | Mod: S$GLB,,, | Performed by: PHYSICIAN ASSISTANT

## 2021-07-19 ENCOUNTER — HOSPITAL ENCOUNTER (OUTPATIENT)
Dept: RADIOLOGY | Facility: HOSPITAL | Age: 71
Discharge: HOME | End: 2021-07-19
Attending: PHYSICIAN ASSISTANT
Payer: MEDICARE

## 2021-07-19 DIAGNOSIS — S69.82XA TFCC (TRIANGULAR FIBROCARTILAGE COMPLEX) INJURY, LEFT, INITIAL ENCOUNTER: ICD-10-CM

## 2021-07-19 PROCEDURE — 73221 MRI JOINT UPR EXTREM W/O DYE: CPT | Mod: TC,PO,LT

## 2021-07-20 ENCOUNTER — OFFICE VISIT (OUTPATIENT)
Dept: ORTHOPEDICS | Facility: CLINIC | Age: 71
End: 2021-07-20
Payer: MEDICARE

## 2021-07-20 VITALS — WEIGHT: 197 LBS | BODY MASS INDEX: 28.2 KG/M2 | HEIGHT: 70 IN

## 2021-07-20 DIAGNOSIS — M24.132 DEGENERATIVE TEAR OF TRIANGULAR FIBROCARTILAGE COMPLEX (TFCC) OF LEFT WRIST: Primary | ICD-10-CM

## 2021-07-20 PROCEDURE — 99213 PR OFFICE/OUTPT VISIT, EST, LEVL III, 20-29 MIN: ICD-10-PCS | Mod: 25,S$GLB,, | Performed by: ORTHOPAEDIC SURGERY

## 2021-07-20 PROCEDURE — 20550 TENDON SHEATH: ICD-10-PCS | Mod: LT,S$GLB,, | Performed by: ORTHOPAEDIC SURGERY

## 2021-07-20 PROCEDURE — 20550 NJX 1 TENDON SHEATH/LIGAMENT: CPT | Mod: LT,S$GLB,, | Performed by: ORTHOPAEDIC SURGERY

## 2021-07-20 PROCEDURE — 99213 OFFICE O/P EST LOW 20 MIN: CPT | Mod: 25,S$GLB,, | Performed by: ORTHOPAEDIC SURGERY

## 2021-07-20 RX ORDER — METHYLPREDNISOLONE ACETATE 40 MG/ML
40 INJECTION, SUSPENSION INTRA-ARTICULAR; INTRALESIONAL; INTRAMUSCULAR; SOFT TISSUE
Status: DISCONTINUED | OUTPATIENT
Start: 2021-07-20 | End: 2021-07-20 | Stop reason: HOSPADM

## 2021-07-20 RX ADMIN — METHYLPREDNISOLONE ACETATE 40 MG: 40 INJECTION, SUSPENSION INTRA-ARTICULAR; INTRALESIONAL; INTRAMUSCULAR; SOFT TISSUE at 12:07

## 2022-01-13 ENCOUNTER — OFFICE VISIT (OUTPATIENT)
Dept: PODIATRY | Facility: CLINIC | Age: 72
End: 2022-01-13
Payer: MEDICARE

## 2022-01-13 ENCOUNTER — HOSPITAL ENCOUNTER (OUTPATIENT)
Dept: RADIOLOGY | Facility: CLINIC | Age: 72
Discharge: HOME | End: 2022-01-13
Attending: PODIATRIST
Payer: MEDICARE

## 2022-01-13 VITALS — WEIGHT: 210.5 LBS | BODY MASS INDEX: 30.14 KG/M2 | HEIGHT: 70 IN

## 2022-01-13 DIAGNOSIS — M20.42 HAMMER TOES OF BOTH FEET: ICD-10-CM

## 2022-01-13 DIAGNOSIS — S96.911A TENDON TEAR, FOOT, RIGHT, INITIAL ENCOUNTER: Primary | ICD-10-CM

## 2022-01-13 DIAGNOSIS — M79.672 PAIN IN BOTH FEET: ICD-10-CM

## 2022-01-13 DIAGNOSIS — M25.571 ACUTE BILATERAL ANKLE PAIN: ICD-10-CM

## 2022-01-13 DIAGNOSIS — S96.911A TENDON TEAR, ANKLE, RIGHT, INITIAL ENCOUNTER: ICD-10-CM

## 2022-01-13 DIAGNOSIS — M79.671 PAIN IN BOTH FEET: ICD-10-CM

## 2022-01-13 DIAGNOSIS — M25.371 INSTABILITY OF ANKLE, RIGHT: ICD-10-CM

## 2022-01-13 DIAGNOSIS — M20.41 HAMMER TOES OF BOTH FEET: ICD-10-CM

## 2022-01-13 DIAGNOSIS — M25.572 ACUTE BILATERAL ANKLE PAIN: ICD-10-CM

## 2022-01-13 PROCEDURE — 73630 X-RAY EXAM OF FOOT: CPT | Mod: 50,S$GLB,, | Performed by: RADIOLOGY

## 2022-01-13 PROCEDURE — 99204 PR OFFICE/OUTPT VISIT, NEW, LEVL IV, 45-59 MIN: ICD-10-PCS | Mod: S$GLB,,, | Performed by: PODIATRIST

## 2022-01-13 PROCEDURE — 73630 XR FOOT COMPLETE 3 VIEW BILATERAL: ICD-10-PCS | Mod: 50,S$GLB,, | Performed by: RADIOLOGY

## 2022-01-13 PROCEDURE — 99204 OFFICE O/P NEW MOD 45 MIN: CPT | Mod: S$GLB,,, | Performed by: PODIATRIST

## 2022-01-13 RX ORDER — ZINC GLUCONATE 50 MG
TABLET ORAL
COMMUNITY
Start: 2021-11-09

## 2022-01-13 RX ORDER — CARBOXYMETHYLCELLULOSE SODIUM 10 MG/ML
GEL OPHTHALMIC
COMMUNITY
Start: 2021-12-16

## 2022-01-13 RX ORDER — BLACK COHOSH ROOT 200 MG
CAPSULE ORAL
COMMUNITY
Start: 2021-11-09

## 2022-01-13 NOTE — PROGRESS NOTES
"  [address] [facility] [address]  [place] [address]  Phone: [phone]   [phone]    Patient's PCP:Rafal Izaguirre MD  Referring Provider: Aaareferral Self    Subjective:      Chief Complaint:: Foot Problem (bilateral)    LOLI Baeza is a 71 y.o. male who presents with a complaint of problems with both feet/ankles. States the pain varies where it is in the foot, depending on the day. 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  States he has balance issues and has had several falls in June/July 2021 and has never really recovered from balance.  States he hyperextended his right leg with one fall, pulling his hamstring 3x in 4 weeks in July 2021.  Any uneven pavement causes him to stumble and fall. The last fall he had, he rolled his right foot all the way in and felt like he was walking on balloons.  Due to this, his right foot is weaker.   Also has bilateral foot drop - left foot foot drop is more pronounced.  Bilateral feet feel like pins and needles off and on.    He went to Dr. Vaca on Monday, who checked his crystals and confirmed aligned and nothing was off/wrong with them.  Dr. Kim ordered PT which patient states did help.      Vitals:    01/13/22 0845   Weight: 95.5 kg (210 lb 8 oz)   Height: 5' 10" (1.778 m)   PainSc:   8      Shoe Size: 11.5    Past Surgical History:   Procedure Laterality Date    ANTERIOR TIBIAL TENDON TRANSFER  2003    COLONOSCOPY      PROCTECTOMY      PROSTATE SURGERY  1994    TONSILLECTOMY       Past Medical History:   Diagnosis Date    Abnormal bone scan of lumbar spine 8/31/2017    Abnormal MRI, lumbar spine 8/31/2017    Allergy     Back complaints     Cancer     prostate    Encounter for blood transfusion     History of prostate cancer 8/31/2017    Migraines     Prostate cancer      Family History   Adopted: Yes        Social History:   Marital Status:   Alcohol History:  reports " current alcohol use.  Tobacco History:  reports that he has quit smoking. His smoking use included cigarettes. He has never used smokeless tobacco.  Drug History:  reports no history of drug use.    Review of patient's allergies indicates:   Allergen Reactions    Bee venom protein (honey bee) Anaphylaxis    Adhesive     Latex     Poison ivy extract        Current Outpatient Medications   Medication Sig Dispense Refill    atenolol (TENORMIN) 25 MG tablet Take 25 mg by mouth once daily.      atorvastatin (LIPITOR) 40 MG tablet TAKE 1 TABLET EVERY EVENING 90 tablet 0    diclofenac sodium (VOLTAREN) 1 % Gel       epinephrine 1 mg/mL Kit Inject 1 each as directed daily as needed. 3 kit 3    fluticasone (FLONASE) 50 mcg/actuation nasal spray 1 spray by Each Nare route once daily.      loratadine (CLARITIN) 10 mg tablet Take 10 mg by mouth daily as needed for Allergies.      multivit-min/FA/lycopen/lutein (CENTRUM SILVER MEN ORAL) Take by mouth.      mv-mn/iron/folic acid/herb 190 (VITAMIN D3 COMPLETE ORAL) Take by mouth.      omeprazole (PRILOSEC) 40 MG capsule       REFRESH LIQUIGEL 1 % ophthalmic solution Place into both eyes.      sildenafiL (VIAGRA) 100 MG tablet       sumatriptan (IMITREX) 100 MG tablet Take 1 tablet (100 mg total) by mouth once. No more than twice in a 24 hour period 9 tablet 0    VITAMIN B-12 1000 MCG tablet Take by mouth.      VITAMIN C 500 MG tablet Take by mouth.      ZINC ORAL Take by mouth.      ASPIRIN (ASPIR-81 ORAL) Take by mouth.      carbamide peroxide (DEBROX) 6.5 % otic solution carbamide peroxide 6.5 % ear drops   INSTILL 5 DROPS INTO AFFECTED EAR(S) BY OTIC ROUTE 2 TIMES PER DAY       No current facility-administered medications for this visit.       Review of Systems   Constitutional: Negative for chills, fatigue, fever and unexpected weight change.   HENT: Negative for hearing loss and trouble swallowing.    Eyes: Negative for photophobia and visual disturbance.    Respiratory: Negative for cough, shortness of breath and wheezing.    Cardiovascular: Negative for chest pain, palpitations and leg swelling.   Gastrointestinal: Negative for abdominal pain and nausea.   Genitourinary: Negative for dysuria and frequency.   Musculoskeletal: Positive for back pain and gait problem. Negative for arthralgias, joint swelling and myalgias.   Skin: Negative for rash and wound.   Neurological: Negative for tremors, seizures, weakness, numbness and headaches.   Hematological: Bruises/bleeds easily.         Objective:        Physical Exam:   Foot Exam    General  General Appearance: appears stated age and healthy   Orientation: alert and oriented to person, place, and time   Affect: appropriate   Gait: antalgic       Right Foot/Ankle     Inspection and Palpation  Ecchymosis: none  Tenderness: (Lateral ankle ligaments and peroneal tendons)  Arch: pes planus  Skin Exam: skin intact;   Neurovascular  Dorsalis pedis: 1+  Posterior tibial: 2+  Capillary Refill: 2+  Saphenous nerve sensation: normal  Tibial nerve sensation: normal  Superficial peroneal nerve sensation: normal  Deep peroneal nerve sensation: normal  Sural nerve sensation: normal    Muscle Strength  Ankle dorsiflexion: 5  Ankle plantar flexion: 5  Ankle inversion: 5  Ankle eversion: 5  Great toe extension: 5  Great toe flexion: 5    Range of Motion    Normal right ankle ROM  Passive  Ankle dorsiflexion: 5  Ankle eversion: pain  Passive eversion: Lateral ankle ligaments and peroneal tendon.  Ankle inversion: pain  Passive inversion: Lateral ankle ligaments and peroneal tendons.    Active  Ankle dorsiflexion: 5  Ankle eversion: pain  Right ankle active eversion: Lateral ankle ligaments and peroneal tendons.  Ankle inversion: pain  Right ankle active inversion: Lateral ankle ligaments and peroneal tendons.    Tests  Anterior drawer: postive   Varus tilt: positive   Talar tilt: positive   Lockett squeeze: negative   Paresthesia:  positive    Left Foot/Ankle      Inspection and Palpation  Ecchymosis: none  Tenderness: (Mild pain medial arch)  Arch: pes planus (Semi rigid pes planus)  Skin Exam: skin intact;   Neurovascular  Dorsalis pedis: 1+  Posterior tibial: 2+  Capillary refill: 2+  Saphenous nerve sensation: normal  Tibial nerve sensation: normal  Superficial peroneal nerve sensation: normal  Deep peroneal nerve sensation: normal  Sural nerve sensation: normal    Muscle Strength  Ankle dorsiflexion: 5  Ankle plantar flexion: 4  Ankle inversion: 4  Ankle eversion: 5  Great toe extension: 5  Great toe flexion: 5    Range of Motion    Normal left ankle ROM  Passive  Ankle dorsiflexion: 5  Ankle eversion: 5  Ankle inversion: 5    Active  Ankle dorsiflexion: 5  Ankle eversion: 5  Ankle inversion: 5    Tests  Paresthesia: positive        Physical Exam  Cardiovascular:      Pulses:           Dorsalis pedis pulses are 1+ on the right side and 1+ on the left side.        Posterior tibial pulses are 2+ on the right side and 2+ on the left side.   Musculoskeletal:      Right ankle: Anterior drawer test positive.        Feet:          Imaging:   AP, lateral, lateral oblique weight-bearing x-rays bilateral feet:  Left foot:  No acute fractures, no bone tumors, no soft tissue masses seen.  Arthritis of talonavicular joint previous surgery done on tuberosity of navicular with tendon repair and what appears to be a Kidner procedure.  Inferior posterior calcaneal exostosis and pes planus  Right foot:  No acute fractures, no bone tumors, no soft tissue masses seen.  Pes planus foot structure.  Inferior posterior calcaneal exostosis.  Arthritis through mid tarsal Lisfranc joint.         Assessment:       1. Pain in both feet    2. Acute bilateral ankle pain    3. Hammer toes of both feet      Plan:   Pain in both feet    Acute bilateral ankle pain    Hammer toes of both feet    Evaluate the patient and described the clinical and x-ray findings left foot  has degenerative changes from previous torn tendons and surgeries that he has not had done that now has arthritis of talonavicular joint.  Recommended he use running shoes over-the-counter inserts treat symptoms with anti-inflammatories as tolerated we the topical anti-inflammatories.  I am not recommend a fusion of the left foot because it is not that symptomatic.    Evaluate the right foot he has ankle ligament tendon pain has symptoms and clinical findings a suggestive of ankle instability due to chronic ankle sprains.  I reviewed his x-rays with him which other showing arthritic changes show no damage to the foot ankle in that area.  I explained to an MRI is needed evaluate the tendons and ligaments or ordering that today without contrast.  Recommend he consider ankle brace when walking.  He is return to see me to review MRI.  No follow-ups on file.    Procedures          Counseling:   I discussed ankle sprain with pt and the different grades/severity of sprain and different ligaments that can be torn.  I also discussed that most ankle sprains are treated conservatively and the pt does well.  Occasionally some sprains do not heal normally and there is insatbility of the joint leading to pain and possible arthritis.  these are usually evaluated by MRI, stress test.    I discussed the conservative treatent of arthritis consisiting of shoe modification, OTC NSAID, cortisone shots, a series of supartz injections, avoiding painful activities and common sense.  I explained that the arthritis may become more painful causng more disability and the possibility of further treatment.  Also discussed may need evaluation by Rheumatologist for possible inflammatory arthritis.  I provided patient education verbally regarding:   Patient diagnosis, treatment options, as well as alternatives, risks, and benefits.     This note was created using Dragon voice recognition software that occasionally misinterpreted phrases or words.

## 2022-01-14 ENCOUNTER — HOSPITAL ENCOUNTER (OUTPATIENT)
Dept: RADIOLOGY | Facility: HOSPITAL | Age: 72
Discharge: HOME | End: 2022-01-14
Attending: PODIATRIST
Payer: MEDICARE

## 2022-01-14 DIAGNOSIS — S96.911A TENDON TEAR, ANKLE, RIGHT, INITIAL ENCOUNTER: ICD-10-CM

## 2022-01-14 DIAGNOSIS — M25.371 INSTABILITY OF ANKLE, RIGHT: ICD-10-CM

## 2022-01-14 DIAGNOSIS — S96.911A TENDON TEAR, FOOT, RIGHT, INITIAL ENCOUNTER: ICD-10-CM

## 2022-01-14 PROCEDURE — 73718 MRI LOWER EXTREMITY W/O DYE: CPT | Mod: TC,PO,RT

## 2022-01-17 ENCOUNTER — PATIENT MESSAGE (OUTPATIENT)
Dept: PODIATRY | Facility: CLINIC | Age: 72
End: 2022-01-17
Payer: MEDICARE

## 2022-01-24 ENCOUNTER — OFFICE VISIT (OUTPATIENT)
Dept: PODIATRY | Facility: CLINIC | Age: 72
End: 2022-01-24
Payer: MEDICARE

## 2022-01-24 VITALS
OXYGEN SATURATION: 98 % | HEIGHT: 70 IN | WEIGHT: 210 LBS | HEART RATE: 60 BPM | SYSTOLIC BLOOD PRESSURE: 124 MMHG | RESPIRATION RATE: 18 BRPM | DIASTOLIC BLOOD PRESSURE: 78 MMHG | BODY MASS INDEX: 30.06 KG/M2

## 2022-01-24 DIAGNOSIS — S96.911A TENDON TEAR, FOOT, RIGHT, INITIAL ENCOUNTER: ICD-10-CM

## 2022-01-24 DIAGNOSIS — M25.371 INSTABILITY OF ANKLE, RIGHT: Primary | ICD-10-CM

## 2022-01-24 PROCEDURE — 99213 PR OFFICE/OUTPT VISIT, EST, LEVL III, 20-29 MIN: ICD-10-PCS | Mod: S$GLB,,, | Performed by: PODIATRIST

## 2022-01-24 PROCEDURE — 99213 OFFICE O/P EST LOW 20 MIN: CPT | Mod: S$GLB,,, | Performed by: PODIATRIST

## 2022-01-24 NOTE — PROGRESS NOTES
"  [address] [facility] [address]  [place] [address]  Phone: [phone]   [phone]    Patient's PCP:Rafal Izaguirre MD  Referring Provider: No ref. provider found    Subjective:      Chief Complaint:: Results (Right foot MRI)    LOLI Baeza is a 71 y.o. male who presents to clinic today to discuss results of right hindfoot MRI complaint of tendon tear. Pateitn states no vast improvement since last visit and rates pain at 4/10.    Vitals:    01/24/22 1054   BP: 124/78   Pulse: 60   Resp: 18   SpO2: 98%   Weight: 95.3 kg (210 lb)   Height: 5' 10" (1.778 m)   PainSc:   4      Shoe Size:     Past Surgical History:   Procedure Laterality Date    ANTERIOR TIBIAL TENDON TRANSFER  2003    COLONOSCOPY      PROCTECTOMY      PROSTATE SURGERY  1994    TONSILLECTOMY       Past Medical History:   Diagnosis Date    Abnormal bone scan of lumbar spine 8/31/2017    Abnormal MRI, lumbar spine 8/31/2017    Allergy     Back complaints     Cancer     prostate    Encounter for blood transfusion     History of prostate cancer 8/31/2017    Migraines     Prostate cancer      Family History   Adopted: Yes        Social History:   Marital Status:   Alcohol History:  reports current alcohol use.  Tobacco History:  reports that he has quit smoking. His smoking use included cigarettes. He has never used smokeless tobacco.  Drug History:  reports no history of drug use.    Review of patient's allergies indicates:   Allergen Reactions    Bee venom protein (honey bee) Anaphylaxis    Adhesive     Latex     Poison ivy extract        Current Outpatient Medications   Medication Sig Dispense Refill    ASPIRIN (ASPIR-81 ORAL) Take by mouth.      atenolol (TENORMIN) 25 MG tablet Take 25 mg by mouth once daily.      atorvastatin (LIPITOR) 40 MG tablet TAKE 1 TABLET EVERY EVENING 90 tablet 0    carbamide peroxide (DEBROX) 6.5 % otic solution carbamide peroxide 6.5 % ear drops   INSTILL 5 DROPS INTO AFFECTED " EAR(S) BY OTIC ROUTE 2 TIMES PER DAY      diclofenac sodium (VOLTAREN) 1 % Gel       epinephrine 1 mg/mL Kit Inject 1 each as directed daily as needed. 3 kit 3    fluticasone (FLONASE) 50 mcg/actuation nasal spray 1 spray by Each Nare route once daily.      loratadine (CLARITIN) 10 mg tablet Take 10 mg by mouth daily as needed for Allergies.      multivit-min/FA/lycopen/lutein (CENTRUM SILVER MEN ORAL) Take by mouth.      mv-mn/iron/folic acid/herb 190 (VITAMIN D3 COMPLETE ORAL) Take by mouth.      omeprazole (PRILOSEC) 40 MG capsule       REFRESH LIQUIGEL 1 % ophthalmic solution Place into both eyes.      sildenafiL (VIAGRA) 100 MG tablet       sumatriptan (IMITREX) 100 MG tablet Take 1 tablet (100 mg total) by mouth once. No more than twice in a 24 hour period 9 tablet 0    VITAMIN B-12 1000 MCG tablet Take by mouth.      VITAMIN C 500 MG tablet Take by mouth.      ZINC ORAL Take by mouth.       No current facility-administered medications for this visit.       Review of Systems   Constitutional: Negative for chills, fatigue, fever and unexpected weight change.   HENT: Negative for hearing loss and trouble swallowing.    Eyes: Negative for photophobia and visual disturbance.   Respiratory: Negative for cough, shortness of breath and wheezing.    Cardiovascular: Negative for chest pain, palpitations and leg swelling.   Gastrointestinal: Negative for abdominal pain and nausea.   Genitourinary: Negative for dysuria and frequency.   Musculoskeletal: Positive for back pain and gait problem. Negative for arthralgias, joint swelling and myalgias.   Skin: Negative for rash and wound.   Neurological: Negative for tremors, seizures, weakness, numbness and headaches.   Hematological: Bruises/bleeds easily.         Objective:        Physical Exam:   Foot Exam    General  General Appearance: appears stated age and healthy   Orientation: alert and oriented to person, place, and time   Affect: appropriate   Gait:  antalgic       Right Foot/Ankle     Inspection and Palpation  Ecchymosis: none  Tenderness: ankle and lisfranc joint (Mild pain lateral ankle by peroneal tendons and anterior talofibular ligament)  Swelling: none   Arch: normal  Skin Exam: skin intact;   Neurovascular  Dorsalis pedis: 2+  Posterior tibial: 2+  Capillary Refill: 2+  Saphenous nerve sensation: normal  Tibial nerve sensation: normal  Superficial peroneal nerve sensation: normal  Deep peroneal nerve sensation: normal  Sural nerve sensation: normal    Muscle Strength  Ankle dorsiflexion: 5  Ankle plantar flexion: 5  Ankle inversion: 5  Ankle eversion: 5  Great toe extension: 5  Great toe flexion: 5    Range of Motion    Normal right ankle ROM  Passive  Ankle eversion: pain  Ankle inversion: pain    Active  Ankle eversion: pain  Ankle inversion: pain          Physical Exam  Cardiovascular:      Pulses:           Dorsalis pedis pulses are 2+ on the right side.        Posterior tibial pulses are 2+ on the right side.   Musculoskeletal:      Right ankle: Tenderness present.         Imaging:   MRI Foot (Hindfoot) Right Without Contrast  Reason: attn: tendon tear Medial pain right ankle/foot after several falls within the past 3 months.; No hx of surgery on the right.    TECHNIQUE: Right foot, with attention to the hindfoot, MRI without contrast    COMPARISON: Right foot radiographs 1/13/2022    FINDINGS:  Focal full-thickness cartilage defect occurs centrally and tibial plafond with underlying degenerative geodes. No other full-thickness cartilage defects identified throughout the tibiotalar or subtalar joints. These joints contain physiologic amounts of fluid.    Subcortical bone marrow signal aeration consisting of increased T2 and low T1 signal involving the second metatarsal base is nonspecific but suggests degenerative etiology. Bone marrow signal is otherwise normal. Lisfranc ligament is intact.    The left ligament complex is intact. Lateral right  ankle ligaments are intact.    Anterior extensor, medial flexor, and lateral peroneal tendons are intact. Trace amounts of fluid lies adjacent to posterior tibialis and flexor digitorum longus tendons as well as the peroneal tendons. The Achilles tendon is intact. Trace fluid lies in the retrocalcaneal bursa.    Plantar fascia is intact. Deep to the plantar fascia origin, 8 mm calcaneal enthesophyte is present.    Normal fat lies within the sinus tarsi. Tarsal tunnel is unremarkable.    IMPRESSION:    1. Negative for tendon tear about the right ankle.  2. Full-thickness cartilage defect on central tibial plafond.  3. Nonspecific bone marrow signal alteration and second metatarsal base, felt to be degenerative in nature and likely reflecting geode and reactive bone marrow edema.    Electronically signed by:  Bob Driver MD  1/14/2022 11:44 AM CST Workstation: [id number]           Assessment:       1. Instability of ankle, right    2. Tendon tear, foot, right, initial encounter      Plan:   Instability of ankle, right    Tendon tear, foot, right, initial encounter  -     Ambulatory referral/consult to Physical/Occupational Therapy; Future; Expected date: 01/31/2022    I evaluated patient today and he seems to be doing better with ankle brace icing anti-inflammatories range of motion.  I reviewed the MRI results as well as the images with the patient and his wife today described to him that there is no devon obvious tear of the ligaments or tendons at require any surgical repair.  There is some damage to the tibial plate find with mild osteochondral defect which appears to be very small on and does not appear to be causing him any problems.  I told this would just be monitored.  I am recommending continue ankle brace with running shoes and I am sending him to physical therapy for them to work on increasing range of motion and strength decreased pain and swelling and help with instability right ankle as well as foot  drop on the left and imbalance problems that the patient is having.  If he is doing better he will not return to see me if he still having problems will re-evaluate him in 4-6 weeks.  At this time there is no surgery indicated.      Procedures          Counseling:     I provided patient education verbally regarding:   Patient diagnosis, treatment options, as well as alternatives, risks, and benefits.     This note was created using Dragon voice recognition software that occasionally misinterpreted phrases or words.

## 2022-06-23 DIAGNOSIS — Z01.89 ENCOUNTER FOR OTHER SPECIFIED SPECIAL EXAMINATIONS: Primary | ICD-10-CM

## 2022-07-14 ENCOUNTER — HOSPITAL ENCOUNTER (OUTPATIENT)
Dept: RADIOLOGY | Facility: HOSPITAL | Age: 72
Discharge: HOME | End: 2022-07-14
Attending: PHYSICAL MEDICINE & REHABILITATION
Payer: OTHER GOVERNMENT

## 2022-07-14 DIAGNOSIS — Z01.89 ENCOUNTER FOR OTHER SPECIFIED SPECIAL EXAMINATIONS: ICD-10-CM

## 2022-07-14 PROCEDURE — 72148 MRI LUMBAR SPINE W/O DYE: CPT | Mod: TC,PO

## 2022-07-14 PROCEDURE — 72141 MRI NECK SPINE W/O DYE: CPT | Mod: TC,PO

## 2022-08-29 ENCOUNTER — TELEPHONE (OUTPATIENT)
Dept: PAIN MEDICINE | Facility: CLINIC | Age: 72
End: 2022-08-29
Payer: MEDICARE

## 2022-08-29 ENCOUNTER — PATIENT MESSAGE (OUTPATIENT)
Dept: PAIN MEDICINE | Facility: CLINIC | Age: 72
End: 2022-08-29
Payer: MEDICARE

## 2022-08-29 ENCOUNTER — OFFICE VISIT (OUTPATIENT)
Dept: ORTHOPEDICS | Facility: CLINIC | Age: 72
End: 2022-08-29
Payer: OTHER GOVERNMENT

## 2022-08-29 VITALS — HEIGHT: 70 IN | WEIGHT: 210 LBS | BODY MASS INDEX: 30.06 KG/M2

## 2022-08-29 DIAGNOSIS — M47.816 LUMBAR FACET ARTHROPATHY: ICD-10-CM

## 2022-08-29 DIAGNOSIS — M51.36 DDD (DEGENERATIVE DISC DISEASE), LUMBAR: Primary | ICD-10-CM

## 2022-08-29 DIAGNOSIS — M51.36 DISC DEGENERATION, LUMBAR: ICD-10-CM

## 2022-08-29 DIAGNOSIS — M48.062 LUMBAR STENOSIS WITH NEUROGENIC CLAUDICATION: Primary | ICD-10-CM

## 2022-08-29 PROCEDURE — 99214 PR OFFICE/OUTPT VISIT, EST, LEVL IV, 30-39 MIN: ICD-10-PCS | Mod: S$GLB,,, | Performed by: ORTHOPAEDIC SURGERY

## 2022-08-29 PROCEDURE — 99214 OFFICE O/P EST MOD 30 MIN: CPT | Mod: S$GLB,,, | Performed by: ORTHOPAEDIC SURGERY

## 2022-08-29 NOTE — PROGRESS NOTES
Subjective:       Patient ID: Toby Baeza is a 72 y.o. male.    Chief Complaint: Pain of the Lumbar Spine (Lumbar pain for about 20 years. States he has pain radiating pain down both legs to feet with numbness sensation present off and on. Pain w/ standing, walking, bending. Has foot drop on both sides. )      History of Present Illness still presents with chronic complaints of back pain radiating to both legs 20 years in duration he has had chiropractic physical therapy treatments but no other treatments his symptoms are worse with prolonged standing and walking.  No bowel or bladder incontinence    Prior to meeting with the patient I reviewed the medical chart in [facility]. This included reviewing the previous progress notes from our office, review of the patient's last appointment with their primary care provider, review of any visits to the emergency room, and review of any pain management appointments or procedures.    Current Medications  Current Outpatient Medications   Medication Sig Dispense Refill    ASPIRIN (ASPIR-81 ORAL) Take by mouth.      atenolol (TENORMIN) 25 MG tablet Take 25 mg by mouth once daily.      atorvastatin (LIPITOR) 40 MG tablet TAKE 1 TABLET EVERY EVENING 90 tablet 0    carbamide peroxide (DEBROX) 6.5 % otic solution carbamide peroxide 6.5 % ear drops   INSTILL 5 DROPS INTO AFFECTED EAR(S) BY OTIC ROUTE 2 TIMES PER DAY      diclofenac sodium (VOLTAREN) 1 % Gel       epinephrine 1 mg/mL Kit Inject 1 each as directed daily as needed. 3 kit 3    fluticasone (FLONASE) 50 mcg/actuation nasal spray 1 spray by Each Nare route once daily.      loratadine (CLARITIN) 10 mg tablet Take 10 mg by mouth daily as needed for Allergies.      multivit-min/FA/lycopen/lutein (CENTRUM SILVER MEN ORAL) Take by mouth.      mv-mn/iron/folic acid/herb 190 (VITAMIN D3 COMPLETE ORAL) Take by mouth.      omeprazole (PRILOSEC) 40 MG capsule       REFRESH LIQUIGEL 1 % ophthalmic solution Place into both eyes.       sildenafiL (VIAGRA) 100 MG tablet       VITAMIN B-12 1000 MCG tablet Take by mouth.      VITAMIN C 500 MG tablet Take by mouth.      ZINC ORAL Take by mouth.      sumatriptan (IMITREX) 100 MG tablet Take 1 tablet (100 mg total) by mouth once. No more than twice in a 24 hour period 9 tablet 0     No current facility-administered medications for this visit.       Allergies  Review of patient's allergies indicates:   Allergen Reactions    Bee venom protein (honey bee) Anaphylaxis    Adhesive     Latex     Poison ivy extract        Past Medical History  Past Medical History:   Diagnosis Date    Abnormal bone scan of lumbar spine 8/31/2017    Abnormal MRI, lumbar spine 8/31/2017    Allergy     Back complaints     Cancer     prostate    Encounter for blood transfusion     History of prostate cancer 8/31/2017    Migraines     Prostate cancer        Surgical History  Past Surgical History:   Procedure Laterality Date    ANTERIOR TIBIAL TENDON TRANSFER  2003    COLONOSCOPY      PROCTECTOMY      PROSTATE SURGERY  1994    TONSILLECTOMY         Family History:   Family History   Adopted: Yes       Social History:   Social History     Socioeconomic History    Marital status:    Tobacco Use    Smoking status: Former     Types: Cigarettes    Smokeless tobacco: Never   Substance and Sexual Activity    Alcohol use: Yes     Comment: occasional    Drug use: No       Hospitalization/Major Diagnostic Procedure:     Review of Systems     General/Constitutional:  Chills denies. Fatigue denies. Fever denies. Weight gain denies. Weight loss denies.    Respiratory:  Shortness of breath denies.    Cardiovascular:  Chest pain denies.    Gastrointestinal:  Constipation denies. Diarrhea denies. Nausea denies. Vomiting denies.     Hematology:  Easy bruising denies. Prolonged bleeding denies.     Genitourinary:  Frequent urination denies. Pain in lower back denies. Painful urination denies.     Musculoskeletal:  See HPI for  details    Skin:  Rash denies.    Neurologic:  Dizziness denies. Gait abnormalities denies. Seizures denies. Tingling/Numbess denies.    Psychiatric:  Anxiety denies. Depressed mood denies.     Objective:   Vital Signs: There were no vitals filed for this visit.     Physical Exam      General Examination:     Constitutional: The patient is alert and oriented to lace person and time. Mood is pleasant.     Head/Face: Normal facial features normal eyebrows    Eyes: Normal extraocular motion bilaterally    Lungs: Respirations are equal and unlabored    Gait is coordinated.    Cardiovascular: There are no swelling or varicosities present.    Lymphatic: Negative for adenopathy    Skin: Normal    Neurological: Level of consciousness normal. Oriented to place person and time and situation    Psychiatric: Oriented to time place person and situation       He can stand erect he has pain with extension and bending of facet joint loading range of motion moderate restricted mild spasm noted straight-leg-raising positive on the left      XRAY Report/ Interpretation :  Lumbar MRI lumbar x-rays were personally reviewed lumbar MRI performed June 14, 2022 was reviewed there is moderate disc space narrowing at L4-5 multilevel facet joint arthritis and moderate to severe spinal canal stenosis at the L4-5 level symptoms are to lesser extent at L3-4.  X-ray report lumbar spine May 16, 2022 from the [facility] was reviewed showing discogenic degenerative changes most notable at L3-4 and L4-5      Assessment:       1. Lumbar stenosis with neurogenic claudication    2. Disc degeneration, lumbar    3. Lumbar facet arthropathy        Plan:       Toby was seen today for pain.    Diagnoses and all orders for this visit:    Lumbar stenosis with neurogenic claudication    Disc degeneration, lumbar  -     X-Ray Lumbar Spine Ap And Lateral  -     X-Ray Pelvis Routine AP    Lumbar facet arthropathy       No follow-ups on file.    Treatment  options discussed he has neurogenic claudication mainly at L4-5 for treatment I have referred recommended an interlaminar L4-5 epidural steroid injection should this be unsuccessful he would be a candidate undergo laminectomy at L3 and L4 levels with decompression I have explained the discogenic pain court with disc space narrowing and if he gets rid of his leg clamp complaints but has residual back pain we may then refer him for medial branch blocks but main purpose of the epidural would be to address his neurogenic claudication symptoms at this time.  He agrees with treatment plan.      Treatment options were discussed with regards to the nature of the medical condition. Conservative pain intervention and surgical options were discussed in detail. The probability of success of each separate treatment option was discussed. The patient expressed a clear understanding of the treatment options. With regards to surgery, the procedure risk, benefits, complications, and outcomes were discussed. No guarantees were given with regards to surgical outcome.   The risk of complications, morbidity, and mortality of patient management decisions have been made at the time of this visit. These are associated with the patient's problems, diagnostic procedures and treatment options. This includes the possible management options selected and those considered but not selected by the patient after shared medical decision making we discussed with the patient.   This note was created using Dragon voice recognition software that occasionally misinterpreted phrases or words.

## 2022-08-29 NOTE — H&P (VIEW-ONLY)
Subjective:       Patient ID: Toby Baeza is a 72 y.o. male.    Chief Complaint: Pain of the Lumbar Spine (Lumbar pain for about 20 years. States he has pain radiating pain down both legs to feet with numbness sensation present off and on. Pain w/ standing, walking, bending. Has foot drop on both sides. )      History of Present Illness still presents with chronic complaints of back pain radiating to both legs 20 years in duration he has had chiropractic physical therapy treatments but no other treatments his symptoms are worse with prolonged standing and walking.  No bowel or bladder incontinence    Prior to meeting with the patient I reviewed the medical chart in [facility]. This included reviewing the previous progress notes from our office, review of the patient's last appointment with their primary care provider, review of any visits to the emergency room, and review of any pain management appointments or procedures.    Current Medications  Current Outpatient Medications   Medication Sig Dispense Refill    ASPIRIN (ASPIR-81 ORAL) Take by mouth.      atenolol (TENORMIN) 25 MG tablet Take 25 mg by mouth once daily.      atorvastatin (LIPITOR) 40 MG tablet TAKE 1 TABLET EVERY EVENING 90 tablet 0    carbamide peroxide (DEBROX) 6.5 % otic solution carbamide peroxide 6.5 % ear drops   INSTILL 5 DROPS INTO AFFECTED EAR(S) BY OTIC ROUTE 2 TIMES PER DAY      diclofenac sodium (VOLTAREN) 1 % Gel       epinephrine 1 mg/mL Kit Inject 1 each as directed daily as needed. 3 kit 3    fluticasone (FLONASE) 50 mcg/actuation nasal spray 1 spray by Each Nare route once daily.      loratadine (CLARITIN) 10 mg tablet Take 10 mg by mouth daily as needed for Allergies.      multivit-min/FA/lycopen/lutein (CENTRUM SILVER MEN ORAL) Take by mouth.      mv-mn/iron/folic acid/herb 190 (VITAMIN D3 COMPLETE ORAL) Take by mouth.      omeprazole (PRILOSEC) 40 MG capsule       REFRESH LIQUIGEL 1 % ophthalmic solution Place into both eyes.       sildenafiL (VIAGRA) 100 MG tablet       VITAMIN B-12 1000 MCG tablet Take by mouth.      VITAMIN C 500 MG tablet Take by mouth.      ZINC ORAL Take by mouth.      sumatriptan (IMITREX) 100 MG tablet Take 1 tablet (100 mg total) by mouth once. No more than twice in a 24 hour period 9 tablet 0     No current facility-administered medications for this visit.       Allergies  Review of patient's allergies indicates:   Allergen Reactions    Bee venom protein (honey bee) Anaphylaxis    Adhesive     Latex     Poison ivy extract        Past Medical History  Past Medical History:   Diagnosis Date    Abnormal bone scan of lumbar spine 8/31/2017    Abnormal MRI, lumbar spine 8/31/2017    Allergy     Back complaints     Cancer     prostate    Encounter for blood transfusion     History of prostate cancer 8/31/2017    Migraines     Prostate cancer        Surgical History  Past Surgical History:   Procedure Laterality Date    ANTERIOR TIBIAL TENDON TRANSFER  2003    COLONOSCOPY      PROCTECTOMY      PROSTATE SURGERY  1994    TONSILLECTOMY         Family History:   Family History   Adopted: Yes       Social History:   Social History     Socioeconomic History    Marital status:    Tobacco Use    Smoking status: Former     Types: Cigarettes    Smokeless tobacco: Never   Substance and Sexual Activity    Alcohol use: Yes     Comment: occasional    Drug use: No       Hospitalization/Major Diagnostic Procedure:     Review of Systems     General/Constitutional:  Chills denies. Fatigue denies. Fever denies. Weight gain denies. Weight loss denies.    Respiratory:  Shortness of breath denies.    Cardiovascular:  Chest pain denies.    Gastrointestinal:  Constipation denies. Diarrhea denies. Nausea denies. Vomiting denies.     Hematology:  Easy bruising denies. Prolonged bleeding denies.     Genitourinary:  Frequent urination denies. Pain in lower back denies. Painful urination denies.     Musculoskeletal:  See HPI for  details    Skin:  Rash denies.    Neurologic:  Dizziness denies. Gait abnormalities denies. Seizures denies. Tingling/Numbess denies.    Psychiatric:  Anxiety denies. Depressed mood denies.     Objective:   Vital Signs: There were no vitals filed for this visit.     Physical Exam      General Examination:     Constitutional: The patient is alert and oriented to lace person and time. Mood is pleasant.     Head/Face: Normal facial features normal eyebrows    Eyes: Normal extraocular motion bilaterally    Lungs: Respirations are equal and unlabored    Gait is coordinated.    Cardiovascular: There are no swelling or varicosities present.    Lymphatic: Negative for adenopathy    Skin: Normal    Neurological: Level of consciousness normal. Oriented to place person and time and situation    Psychiatric: Oriented to time place person and situation       He can stand erect he has pain with extension and bending of facet joint loading range of motion moderate restricted mild spasm noted straight-leg-raising positive on the left      XRAY Report/ Interpretation :  Lumbar MRI lumbar x-rays were personally reviewed lumbar MRI performed June 14, 2022 was reviewed there is moderate disc space narrowing at L4-5 multilevel facet joint arthritis and moderate to severe spinal canal stenosis at the L4-5 level symptoms are to lesser extent at L3-4.  X-ray report lumbar spine May 16, 2022 from the [facility] was reviewed showing discogenic degenerative changes most notable at L3-4 and L4-5      Assessment:       1. Lumbar stenosis with neurogenic claudication    2. Disc degeneration, lumbar    3. Lumbar facet arthropathy        Plan:       Toby was seen today for pain.    Diagnoses and all orders for this visit:    Lumbar stenosis with neurogenic claudication    Disc degeneration, lumbar  -     X-Ray Lumbar Spine Ap And Lateral  -     X-Ray Pelvis Routine AP    Lumbar facet arthropathy       No follow-ups on file.    Treatment  options discussed he has neurogenic claudication mainly at L4-5 for treatment I have referred recommended an interlaminar L4-5 epidural steroid injection should this be unsuccessful he would be a candidate undergo laminectomy at L3 and L4 levels with decompression I have explained the discogenic pain court with disc space narrowing and if he gets rid of his leg clamp complaints but has residual back pain we may then refer him for medial branch blocks but main purpose of the epidural would be to address his neurogenic claudication symptoms at this time.  He agrees with treatment plan.      Treatment options were discussed with regards to the nature of the medical condition. Conservative pain intervention and surgical options were discussed in detail. The probability of success of each separate treatment option was discussed. The patient expressed a clear understanding of the treatment options. With regards to surgery, the procedure risk, benefits, complications, and outcomes were discussed. No guarantees were given with regards to surgical outcome.   The risk of complications, morbidity, and mortality of patient management decisions have been made at the time of this visit. These are associated with the patient's problems, diagnostic procedures and treatment options. This includes the possible management options selected and those considered but not selected by the patient after shared medical decision making we discussed with the patient.   This note was created using Dragon voice recognition software that occasionally misinterpreted phrases or words.

## 2022-08-29 NOTE — TELEPHONE ENCOUNTER
Procedure Information    Service Details  Procedure Modifiers Provider Requested Approved   [id number] - AMB REFERRAL/CONSULT TO PAIN CLINIC None Shashi Chambers MD 1 1     Diagnosis Information    Diagnosis   M51.36 (ICD-10-CM) - Disc degeneration, lumbar   M48.062 (ICD-10-CM) - Lumbar stenosis with neurogenic claudication   M47.816 (ICD-10-CM) - Lumbar facet arthropathy       Referral Notes  Number of Notes: 1    .  Type Date User Summary Attachment   Provider Comments 08/29/2022 10:10 AM Sonu Salas MD Provider Comments -   Note    Interlaminar L4 ASHUTOSH            Ok to schedule? Thanks

## 2022-09-23 ENCOUNTER — HOSPITAL ENCOUNTER (OUTPATIENT)
Facility: AMBULARY SURGERY CENTER | Age: 72
Discharge: HOME | End: 2022-09-23
Attending: ANESTHESIOLOGY | Admitting: ANESTHESIOLOGY
Payer: MEDICARE

## 2022-09-23 DIAGNOSIS — M54.16 LUMBAR RADICULITIS: ICD-10-CM

## 2022-09-23 PROCEDURE — 62323 PR INJ LUMBAR/SACRAL, W/IMAGING GUIDANCE: ICD-10-PCS | Mod: ,,, | Performed by: ANESTHESIOLOGY

## 2022-09-23 PROCEDURE — 62323 NJX INTERLAMINAR LMBR/SAC: CPT | Performed by: ANESTHESIOLOGY

## 2022-09-23 PROCEDURE — 62323 NJX INTERLAMINAR LMBR/SAC: CPT | Mod: ,,, | Performed by: ANESTHESIOLOGY

## 2022-09-23 RX ORDER — SODIUM CHLORIDE, SODIUM LACTATE, POTASSIUM CHLORIDE, CALCIUM CHLORIDE 600; 310; 30; 20 MG/100ML; MG/100ML; MG/100ML; MG/100ML
INJECTION, SOLUTION INTRAVENOUS ONCE AS NEEDED
Status: COMPLETED | OUTPATIENT
Start: 2022-09-23 | End: 2022-09-23

## 2022-09-23 RX ORDER — MIDAZOLAM HYDROCHLORIDE 1 MG/ML
INJECTION INTRAMUSCULAR; INTRAVENOUS
Status: DISCONTINUED | OUTPATIENT
Start: 2022-09-23 | End: 2022-09-23 | Stop reason: HOSPADM

## 2022-09-23 RX ORDER — FENTANYL CITRATE 50 UG/ML
INJECTION, SOLUTION INTRAMUSCULAR; INTRAVENOUS
Status: DISCONTINUED | OUTPATIENT
Start: 2022-09-23 | End: 2022-09-23 | Stop reason: HOSPADM

## 2022-09-23 RX ORDER — LIDOCAINE HYDROCHLORIDE 10 MG/ML
INJECTION, SOLUTION EPIDURAL; INFILTRATION; INTRACAUDAL; PERINEURAL
Status: DISCONTINUED | OUTPATIENT
Start: 2022-09-23 | End: 2022-09-23 | Stop reason: HOSPADM

## 2022-09-23 RX ORDER — DEXAMETHASONE SODIUM PHOSPHATE 10 MG/ML
INJECTION INTRAMUSCULAR; INTRAVENOUS
Status: DISCONTINUED | OUTPATIENT
Start: 2022-09-23 | End: 2022-09-23 | Stop reason: HOSPADM

## 2022-09-23 RX ORDER — SODIUM CHLORIDE 9 MG/ML
INJECTION, SOLUTION INTRAMUSCULAR; INTRAVENOUS; SUBCUTANEOUS
Status: DISCONTINUED | OUTPATIENT
Start: 2022-09-23 | End: 2022-09-23 | Stop reason: HOSPADM

## 2022-09-23 RX ADMIN — SODIUM CHLORIDE, SODIUM LACTATE, POTASSIUM CHLORIDE, CALCIUM CHLORIDE: 600; 310; 30; 20 INJECTION, SOLUTION INTRAVENOUS at 08:09

## 2022-09-23 NOTE — OP NOTE
PROCEDURE DATE: 9/23/2022    Procedure:   Interlaminar epidural steroid injection at L4-5 under fluoroscopic guidance.    Diagnosis: lUMBAR radiculitis  pOSTOP DIAGNOSIS: sAME    Physician: Shashi Chambers M.D.    Medications injected:10 mg dexamethasone with 4 ml of preservative free NaCl    Local anesthetic injected:    Lidocaine 1% 2 ml total    Sedation Medications: RN IV Sedation    Estimated blood loss:  None    Complications:  None    Technique:  Time-out taken to identify patient and procedure prior to starting the procedure.  With the patient laying in a prone position, the area was prepped and draped in the usual sterile fashion using ChloraPrep and a fenestrated drape.  After determining the target level with an AP fluoroscopic view, local anesthetic was given using a 25-gauge 1.5 inch needle by raising a wheal and then infiltrating toward the interlaminar entry space.  A 3.5inch 20-gauge Touhy needle was introduced under AP fluoroscopic guidance to the interlaminar space of L4-5. Once the trajectory was established, the needle was visualized in the lateral view and advanced using loss of resistance technique. Once in the desired position, 1ml contrast was injected to confirm placement and there was no vascular uptake nor intrathecal spread.  The medication was then injected slowly. The patient tolerated the procedure well.      The patient was monitored after the procedure.   They were given post-procedure and discharge instructions to follow at home.  The patient was discharged in a stable condition.

## 2022-09-23 NOTE — DISCHARGE SUMMARY
[facility]  Discharge Note  Short Stay    Procedure(s) (LRB):  Injection-steroid-epidural-lumbar L4-5 (N/A)    OUTCOME: Patient tolerated treatment/procedure well without complication and is now ready for discharge.    DISPOSITION: Home or Self Care    FINAL DIAGNOSIS:  <principal problem not specified>    FOLLOWUP: In clinic    DISCHARGE INSTRUCTIONS:    Discharge Procedure Orders   Notify your health care provider if you experience any of the following:  temperature >100.4     Notify your health care provider if you experience any of the following:  severe uncontrolled pain     Notify your health care provider if you experience any of the following:  redness, tenderness, or signs of infection (pain, swelling, redness, odor or green/yellow discharge around incision site)     Activity as tolerated        TIME SPENT ON DISCHARGE:   30 minutes

## 2022-09-27 VITALS
WEIGHT: 210 LBS | BODY MASS INDEX: 30.06 KG/M2 | TEMPERATURE: 98 F | HEART RATE: 51 BPM | RESPIRATION RATE: 18 BRPM | SYSTOLIC BLOOD PRESSURE: 126 MMHG | OXYGEN SATURATION: 98 % | HEIGHT: 70 IN | DIASTOLIC BLOOD PRESSURE: 73 MMHG

## 2024-09-24 DIAGNOSIS — M54.16 LUMBAR RADICULOPATHY: Primary | ICD-10-CM

## 2024-09-24 DIAGNOSIS — Z01.89 ENCOUNTER FOR OTHER SPECIFIED SPECIAL EXAMINATIONS: ICD-10-CM

## 2024-09-30 ENCOUNTER — HOSPITAL ENCOUNTER (OUTPATIENT)
Dept: RADIOLOGY | Facility: HOSPITAL | Age: 74
Discharge: HOME | End: 2024-09-30
Attending: PHYSICAL MEDICINE & REHABILITATION
Payer: MEDICARE

## 2024-09-30 DIAGNOSIS — Z01.89 ENCOUNTER FOR OTHER SPECIFIED SPECIAL EXAMINATIONS: ICD-10-CM

## 2024-09-30 DIAGNOSIS — M54.16 LUMBAR RADICULOPATHY: ICD-10-CM

## 2024-09-30 PROCEDURE — 72146 MRI CHEST SPINE W/O DYE: CPT | Mod: 26,,, | Performed by: RADIOLOGY

## 2024-09-30 PROCEDURE — 72146 MRI CHEST SPINE W/O DYE: CPT | Mod: TC,PO

## 2024-10-01 DIAGNOSIS — M54.16 LUMBAR RADICULOPATHY: ICD-10-CM

## 2024-10-01 DIAGNOSIS — Z01.89 ENCOUNTER FOR OTHER SPECIFIED SPECIAL EXAMINATIONS: Primary | ICD-10-CM

## 2024-10-01 DIAGNOSIS — M54.2 CERVICALGIA: ICD-10-CM

## 2024-10-17 ENCOUNTER — OFFICE VISIT (OUTPATIENT)
Dept: ORTHOPEDICS | Facility: CLINIC | Age: 74
End: 2024-10-17
Payer: OTHER GOVERNMENT

## 2024-10-17 ENCOUNTER — HOSPITAL ENCOUNTER (OUTPATIENT)
Dept: RADIOLOGY | Facility: HOSPITAL | Age: 74
Discharge: HOME | End: 2024-10-17
Attending: ORTHOPAEDIC SURGERY
Payer: OTHER GOVERNMENT

## 2024-10-17 VITALS — BODY MASS INDEX: 30.08 KG/M2 | WEIGHT: 210.13 LBS | HEIGHT: 70 IN

## 2024-10-17 DIAGNOSIS — G56.03 CARPAL TUNNEL SYNDROME ON BOTH SIDES: ICD-10-CM

## 2024-10-17 DIAGNOSIS — G56.23 CUBITAL TUNNEL SYNDROME, BILATERAL: Primary | ICD-10-CM

## 2024-10-17 PROCEDURE — 73130 X-RAY EXAM OF HAND: CPT | Mod: 26,50,, | Performed by: RADIOLOGY

## 2024-10-17 PROCEDURE — 73130 X-RAY EXAM OF HAND: CPT | Mod: TC,50,PN

## 2024-10-17 PROCEDURE — 99999 PR PBB SHADOW E&M-EST. PATIENT-LVL IV: CPT | Mod: PBBFAC,,, | Performed by: ORTHOPAEDIC SURGERY

## 2024-10-17 PROCEDURE — 99214 OFFICE O/P EST MOD 30 MIN: CPT | Mod: PBBFAC,25,PN | Performed by: ORTHOPAEDIC SURGERY

## 2024-10-17 RX ORDER — SODIUM FLUORIDE 5 MG/G
PASTE, DENTIFRICE DENTAL
COMMUNITY
Start: 2024-05-08

## 2024-10-17 RX ORDER — ESCITALOPRAM OXALATE 20 MG/1
TABLET ORAL
COMMUNITY
Start: 2024-08-22

## 2024-10-17 RX ORDER — TADALAFIL 20 MG/1
20 TABLET ORAL
COMMUNITY
Start: 2024-07-30

## 2024-10-17 RX ORDER — QUETIAPINE FUMARATE 50 MG/1
50 TABLET, EXTENDED RELEASE ORAL
COMMUNITY
Start: 2024-08-22

## 2024-10-17 RX ORDER — TRETINOIN 0.25 MG/G
CREAM TOPICAL
COMMUNITY
Start: 2024-06-24 | End: 2025-06-25

## 2024-10-17 NOTE — PROGRESS NOTES
[facility]    Subjective:     Chief Complaint:   Chief Complaint   Patient presents with    Right Hand - Pain     Patient is here with complaints of bilateral hand pain for many years and has recently progressively gotten worse. Has numbness and tingling as well as occasional cramping up     Left Hand - Pain       Past Medical History:   Diagnosis Date    Abnormal bone scan of lumbar spine 8/31/2017    Abnormal MRI, lumbar spine 8/31/2017    Allergy     Back complaints     Cancer     prostate    Encounter for blood transfusion     History of prostate cancer 8/31/2017    Migraines     Prostate cancer        Past Surgical History:   Procedure Laterality Date    ANTERIOR TIBIAL TENDON TRANSFER  2003    CATARACT EXTRACTION Left 2023    COLONOSCOPY      EPIDURAL STEROID INJECTION INTO LUMBAR SPINE N/A 09/23/2022    Procedure: Injection-steroid-epidural-lumbar L4-5;  Surgeon: Shashi Chambers MD;  Location: [facility] OR;  Service: Pain Management;  Laterality: N/A;    PROCTECTOMY      PROSTATE SURGERY  1994    TONSILLECTOMY         Current Outpatient Medications   Medication Sig    ASPIRIN (ASPIR-81 ORAL) Take by mouth.    atenolol (TENORMIN) 25 MG tablet Take 25 mg by mouth once daily.    atorvastatin (LIPITOR) 40 MG tablet TAKE 1 TABLET EVERY EVENING    carbamide peroxide (DEBROX) 6.5 % otic solution carbamide peroxide 6.5 % ear drops   INSTILL 5 DROPS INTO AFFECTED EAR(S) BY OTIC ROUTE 2 TIMES PER DAY    diclofenac sodium (VOLTAREN) 1 % Gel     epinephrine 1 mg/mL Kit Inject 1 each as directed daily as needed.    EScitalopram oxalate (LEXAPRO) 20 MG tablet     fluoride, sodium, (PREVIDENT 5000 PLUS) 1.1 % Crea     fluticasone (FLONASE) 50 mcg/actuation nasal spray 1 spray by Each Nare route once daily.    loratadine (CLARITIN) 10 mg tablet Take 10 mg by mouth daily as needed for Allergies.    multivit-min/FA/lycopen/lutein (CENTRUM SILVER MEN ORAL) Take by mouth.    mv-mn/iron/folic acid/herb 190 (VITAMIN D3 COMPLETE  ORAL) Take by mouth.    omeprazole (PRILOSEC) 40 MG capsule     QUEtiapine (SEROQUEL XR) 50 mg Tb24 Take 50 mg by mouth.    REFRESH LIQUIGEL 1 % ophthalmic solution Place into both eyes.    sildenafiL (VIAGRA) 100 MG tablet     tadalafiL (CIALIS) 20 MG Tab Take 20 mg by mouth.    tretinoin (RETIN-A) 0.025 % cream APPLY SMALL AMOUNT TOPICALLY ONCE DAILY FOR ACNE LESIONS    VITAMIN B-12 1000 MCG tablet Take by mouth.    VITAMIN C 500 MG tablet Take by mouth.    ZINC ORAL Take by mouth.    sumatriptan (IMITREX) 100 MG tablet Take 1 tablet (100 mg total) by mouth once. No more than twice in a 24 hour period     No current facility-administered medications for this visit.       Review of patient's allergies indicates:   Allergen Reactions    Bee venom protein (honey bee) Anaphylaxis    Adhesive     Latex     Poison ivy extract        Family History   Adopted: Yes       Social History     Socioeconomic History    Marital status:    Tobacco Use    Smoking status: Former     Types: Cigarettes    Smokeless tobacco: Never   Substance and Sexual Activity    Alcohol use: Yes     Comment: occasional    Drug use: No       History of present illness:  74-year-old male, presents to clinic today from the [facility].  He is here today for evaluation of complaints of bilateral upper extremity and hand pain.  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  He states that he has had multiple cysts, joint deformities, painful range of motion in the bilateral hands for years.  Most recently, he underwent a EMG/nerve conduction [facility] by [facility] for bilateral upper extremity numbness and tingling.  He has got numbness and tingling in the bilateral hands, worse on the left than the right per history although I believe he is truly symptomatic bilaterally as he talks to me more.  He states that the left nerve was trapped at the elbow and the right was trapped in his neck.   Unfortunately the tests are not available for review today.      Review of Systems:    Constitution: Negative for chills, fever, and sweats.  Negative for unexplained weight loss.    HENT:  Negative for headaches and blurry vision.    Cardiovascular:Negative for chest pain or irregular heart beat. Negative for hypertension.    Respiratory:  Negative for cough and shortness of breath.    Gastrointestinal: Negative for abdominal pain, heartburn, melena, nausea, and vomitting.    Genitourinary:  Negative bladder incontinence and dysuria.    Musculoskeletal:  See HPI for details.     Neurological: Negative for numbness.    Psychiatric/Behavioral: Negative for depression.  The patient is not nervous/anxious.      Endocrine: Negative for polyuria    Hematologic/Lymphatic: Negative for bleeding problem.  Does not bruise/bleed easily.    Skin: Negative for poor would healing and rash    Objective:      Physical Examination:    Vital Signs:  There were no vitals filed for this visit.    Body mass index is 30.15 kg/m².    This a well-developed, well nourished patient in no acute distress.  They are alert and oriented and cooperative to examination.        Examination of the bilateral upper extremities, the hands skin is dry and intact, no erythema or ecchymosis, no signs and symptoms of infection.  Range of motion, he can make a fist bilaterally, he can oppose all fingers.  He has got some joint deformities with angulation and or contractures throughout the proximal and distal IP joints.  He is also got pain in the bilateral basilar joints with positive grind testing bilaterally.  He is also got crepitus.  Subjectively he describes altered sensation in the left little and ulnar aspect of the ring finger.  Phalen's test was positive bilaterally.  Tinel's mildly positive at the bilateral wrists and more positive at the left elbow.    Pertinent New Results:    XRAY Report / Interpretation:   AP lateral oblique views of the  "bilateral hands taken today in the office demonstrate scattered degenerative changes with bone-on-bone deformities in multiple distal and proximal IP joints.  He also has severe advanced basilar arthritis with sclerosis subchondral cyst formation and collapse.      Assessment/Plan:      Hand arthritis, patient with fairly extensive hand arthritis affecting multiple distal and proximal IP joints as well as the bilateral thumbs.  He is also got symptoms of carpal tunnel syndrome.  We are going to request the EMG/nerve conduction studies from the [facility] and see him back with those results.    The patient and I had a thorough discussion today.  We discussed the working diagnosis as well as several other potential alternative diagnoses.  Treatment options were discussed, both conservative and surgical.  Conservative treatment options would include things such as activity modifications, workplace modifications, a period of rest, oral vs topical over the counter and prescription anti-inflammatory medications, physical therapy / occupational therapy, splinting / bracing, immobilization, corticosteroid injections, and others.    Johnny Peraza, Physician Assistant, served in the capacity as a "scribe" for this patient encounter.  A "face-to-face" encounter occurred with Dr. Russ Kim on this date.  The treatment plan and medical decision-making is outlined above. Patient was seen and examined with a chaperone.         This note was created using Dragon voice recognition software that occasionally misinterpreted phrases or words.          "

## 2025-08-12 ENCOUNTER — HOSPITAL ENCOUNTER (EMERGENCY)
Facility: HOSPITAL | Age: 75
Discharge: HOME | End: 2025-08-12
Attending: EMERGENCY MEDICINE
Payer: OTHER GOVERNMENT

## 2025-08-12 VITALS
SYSTOLIC BLOOD PRESSURE: 171 MMHG | OXYGEN SATURATION: 99 % | TEMPERATURE: 98 F | HEART RATE: 69 BPM | WEIGHT: 208 LBS | RESPIRATION RATE: 18 BRPM | BODY MASS INDEX: 29.78 KG/M2 | HEIGHT: 70 IN | DIASTOLIC BLOOD PRESSURE: 86 MMHG

## 2025-08-12 DIAGNOSIS — K62.5 BRBPR (BRIGHT RED BLOOD PER RECTUM): ICD-10-CM

## 2025-08-12 DIAGNOSIS — R11.0 NAUSEA: ICD-10-CM

## 2025-08-12 DIAGNOSIS — R19.7 DIARRHEA, UNSPECIFIED TYPE: Primary | ICD-10-CM

## 2025-08-12 LAB
ABSOLUTE EOSINOPHIL (SMH): 0.07 K/UL
ABSOLUTE MONOCYTE (SMH): 0.77 K/UL (ref 0.3–1)
ABSOLUTE NEUTROPHIL COUNT (SMH): 7.5 K/UL (ref 1.8–7.7)
ALBUMIN SERPL-MCNC: 4.3 G/DL (ref 3.5–5.2)
ALP SERPL-CCNC: 70 UNIT/L (ref 55–135)
ALT SERPL-CCNC: 66 UNIT/L (ref 10–44)
ANION GAP (SMH): 10 MMOL/L (ref 8–16)
AST SERPL-CCNC: 72 UNIT/L (ref 10–40)
BASOPHILS # BLD AUTO: 0.08 K/UL
BASOPHILS NFR BLD AUTO: 0.8 %
BILIRUB SERPL-MCNC: 0.9 MG/DL (ref 0.1–1)
BUN SERPL-MCNC: 8 MG/DL (ref 8–23)
CALCIUM SERPL-MCNC: 8.9 MG/DL (ref 8.7–10.5)
CHLORIDE SERPL-SCNC: 103 MMOL/L (ref 95–110)
CO2 SERPL-SCNC: 26 MMOL/L (ref 23–29)
CREAT SERPL-MCNC: 0.8 MG/DL (ref 0.5–1.4)
CREAT SERPL-MCNC: 0.9 MG/DL (ref 0.5–1.4)
ERYTHROCYTE [DISTWIDTH] IN BLOOD BY AUTOMATED COUNT: 14.8 % (ref 11.5–14.5)
GFR SERPLBLD CREATININE-BSD FMLA CKD-EPI: >60 ML/MIN/1.73/M2
GLUCOSE SERPL-MCNC: 115 MG/DL (ref 70–110)
HCT VFR BLD AUTO: 42.9 % (ref 40–54)
HGB BLD-MCNC: 14.4 GM/DL (ref 14–18)
IMM GRANULOCYTES # BLD AUTO: 0.05 K/UL (ref 0–0.04)
IMM GRANULOCYTES NFR BLD AUTO: 0.5 % (ref 0–0.5)
INR PPP: 1 (ref 0.8–1.2)
LIPASE SERPL-CCNC: 15 U/L (ref 4–60)
LYMPHOCYTES # BLD AUTO: 1.43 K/UL (ref 1–4.8)
MCH RBC QN AUTO: 33.2 PG (ref 27–31)
MCHC RBC AUTO-ENTMCNC: 33.6 G/DL (ref 32–36)
MCV RBC AUTO: 99 FL (ref 82–98)
NUCLEATED RBC (/100WBC) (SMH): 0 /100 WBC
OB PNL STL: POSITIVE
PLATELET # BLD AUTO: 251 K/UL (ref 150–450)
PMV BLD AUTO: 10.2 FL (ref 9.2–12.9)
POTASSIUM SERPL-SCNC: 4 MMOL/L (ref 3.5–5.1)
PROT SERPL-MCNC: 7.3 GM/DL (ref 6–8.4)
PROTHROMBIN TIME: 11.4 SECONDS (ref 9–12.5)
RBC # BLD AUTO: 4.34 M/UL (ref 4.6–6.2)
RELATIVE EOSINOPHIL (SMH): 0.7 % (ref 0–8)
RELATIVE LYMPHOCYTE (SMH): 14.4 % (ref 18–48)
RELATIVE MONOCYTE (SMH): 7.8 % (ref 4–15)
RELATIVE NEUTROPHIL (SMH): 75.8 % (ref 38–73)
SAMPLE: NORMAL
SODIUM SERPL-SCNC: 139 MMOL/L (ref 136–145)
WBC # BLD AUTO: 9.91 K/UL (ref 3.9–12.7)

## 2025-08-12 PROCEDURE — 82040 ASSAY OF SERUM ALBUMIN: CPT | Performed by: NURSE PRACTITIONER

## 2025-08-12 PROCEDURE — 82565 ASSAY OF CREATININE: CPT

## 2025-08-12 PROCEDURE — 85610 PROTHROMBIN TIME: CPT | Performed by: NURSE PRACTITIONER

## 2025-08-12 PROCEDURE — 99285 EMERGENCY DEPT VISIT HI MDM: CPT | Mod: 25

## 2025-08-12 PROCEDURE — 85025 COMPLETE CBC W/AUTO DIFF WBC: CPT | Performed by: NURSE PRACTITIONER

## 2025-08-12 PROCEDURE — 83690 ASSAY OF LIPASE: CPT | Performed by: EMERGENCY MEDICINE

## 2025-08-12 PROCEDURE — 25500020 PHARM REV CODE 255: Performed by: EMERGENCY MEDICINE

## 2025-08-12 PROCEDURE — 82272 OCCULT BLD FECES 1-3 TESTS: CPT | Performed by: NURSE PRACTITIONER

## 2025-08-12 RX ORDER — ONDANSETRON 4 MG/1
8 TABLET, ORALLY DISINTEGRATING ORAL EVERY 6 HOURS PRN
Qty: 12 TABLET | Refills: 0 | Status: SHIPPED | OUTPATIENT
Start: 2025-08-12

## 2025-08-12 RX ADMIN — IOHEXOL 100 ML: 350 INJECTION, SOLUTION INTRAVENOUS at 03:08

## (undated) DEVICE — NDL HYPODERMIC BLUNT 18G 1.5IN

## (undated) DEVICE — SYR LUER-LOCK STERILE 5ML

## (undated) DEVICE — CHLORAPREP 10.5 ML APPLICATOR

## (undated) DEVICE — NDL SAFETY 25G X 1.5 ECLIPSE

## (undated) DEVICE — SYS LABEL CORRECT MED

## (undated) DEVICE — NDL TUOHY EPIDURAL 20G X 3.5

## (undated) DEVICE — SYR GLASS 5CC LUER LOK

## (undated) DEVICE — GLOVE SURG ULTRA TOUCH 7.5

## (undated) DEVICE — TUBING MINIBORE EXTENSION